# Patient Record
Sex: FEMALE | Race: WHITE | NOT HISPANIC OR LATINO | Employment: UNEMPLOYED | URBAN - METROPOLITAN AREA
[De-identification: names, ages, dates, MRNs, and addresses within clinical notes are randomized per-mention and may not be internally consistent; named-entity substitution may affect disease eponyms.]

---

## 2018-02-07 ENCOUNTER — HOSPITAL ENCOUNTER (EMERGENCY)
Facility: CLINIC | Age: 53
Discharge: HOME | End: 2018-02-07
Attending: EMERGENCY MEDICINE | Admitting: EMERGENCY MEDICINE
Payer: COMMERCIAL

## 2018-02-07 ENCOUNTER — APPOINTMENT (OUTPATIENT)
Dept: CT IMAGING | Facility: CLINIC | Age: 53
End: 2018-02-07
Attending: EMERGENCY MEDICINE
Payer: COMMERCIAL

## 2018-02-07 VITALS
TEMPERATURE: 100.6 F | DIASTOLIC BLOOD PRESSURE: 68 MMHG | BODY MASS INDEX: 25.27 KG/M2 | HEIGHT: 64 IN | OXYGEN SATURATION: 95 % | RESPIRATION RATE: 16 BRPM | WEIGHT: 148 LBS | SYSTOLIC BLOOD PRESSURE: 122 MMHG

## 2018-02-07 DIAGNOSIS — E87.6 HYPOKALEMIA: ICD-10-CM

## 2018-02-07 DIAGNOSIS — R50.9 FEVER, UNKNOWN ORIGIN: ICD-10-CM

## 2018-02-07 LAB
ALBUMIN UR-MCNC: NEGATIVE MG/DL
ANION GAP SERPL CALCULATED.3IONS-SCNC: 10 MMOL/L (ref 3–14)
APPEARANCE UR: CLEAR
BASOPHILS # BLD AUTO: 0.1 10E9/L (ref 0–0.2)
BASOPHILS NFR BLD AUTO: 0.5 %
BILIRUB UR QL STRIP: ABNORMAL
BUN SERPL-MCNC: 6 MG/DL (ref 7–30)
CALCIUM SERPL-MCNC: 8.7 MG/DL (ref 8.5–10.1)
CHLORIDE SERPL-SCNC: 101 MMOL/L (ref 94–109)
CO2 SERPL-SCNC: 25 MMOL/L (ref 20–32)
COLOR UR AUTO: YELLOW
CREAT SERPL-MCNC: 0.64 MG/DL (ref 0.52–1.04)
DIFFERENTIAL METHOD BLD: ABNORMAL
EOSINOPHIL # BLD AUTO: 0 10E9/L (ref 0–0.7)
EOSINOPHIL NFR BLD AUTO: 0.1 %
ERYTHROCYTE [DISTWIDTH] IN BLOOD BY AUTOMATED COUNT: 13.6 % (ref 10–15)
FLUAV+FLUBV AG SPEC QL: NEGATIVE
FLUAV+FLUBV AG SPEC QL: NEGATIVE
GFR SERPL CREATININE-BSD FRML MDRD: >90 ML/MIN/1.7M2
GLUCOSE SERPL-MCNC: 93 MG/DL (ref 70–99)
GLUCOSE UR STRIP-MCNC: NEGATIVE MG/DL
HCT VFR BLD AUTO: 39.7 % (ref 35–47)
HGB BLD-MCNC: 13.2 G/DL (ref 11.7–15.7)
HGB UR QL STRIP: NEGATIVE
IMM GRANULOCYTES # BLD: 0 10E9/L (ref 0–0.4)
IMM GRANULOCYTES NFR BLD: 0.1 %
KETONES UR STRIP-MCNC: 40 MG/DL
LACTATE SERPL-SCNC: 0.9 MMOL/L (ref 0.4–2)
LACTATE SERPL-SCNC: 2.1 MMOL/L (ref 0.4–2)
LEUKOCYTE ESTERASE UR QL STRIP: NEGATIVE
LYMPHOCYTES # BLD AUTO: 0.5 10E9/L (ref 0.8–5.3)
LYMPHOCYTES NFR BLD AUTO: 4.6 %
MCH RBC QN AUTO: 27.2 PG (ref 26.5–33)
MCHC RBC AUTO-ENTMCNC: 33.2 G/DL (ref 31.5–36.5)
MCV RBC AUTO: 82 FL (ref 78–100)
MONOCYTES # BLD AUTO: 0.4 10E9/L (ref 0–1.3)
MONOCYTES NFR BLD AUTO: 4.4 %
NEUTROPHILS # BLD AUTO: 9 10E9/L (ref 1.6–8.3)
NEUTROPHILS NFR BLD AUTO: 90.3 %
NITRATE UR QL: NEGATIVE
NRBC # BLD AUTO: 0 10*3/UL
NRBC BLD AUTO-RTO: 0 /100
PH UR STRIP: 6.5 PH (ref 5–7)
PLATELET # BLD AUTO: 321 10E9/L (ref 150–450)
POTASSIUM SERPL-SCNC: 2.9 MMOL/L (ref 3.4–5.3)
RBC # BLD AUTO: 4.86 10E12/L (ref 3.8–5.2)
SODIUM SERPL-SCNC: 136 MMOL/L (ref 133–144)
SOURCE: ABNORMAL
SP GR UR STRIP: 1.02 (ref 1–1.03)
SPECIMEN SOURCE: NORMAL
UROBILINOGEN UR STRIP-ACNC: 0.2 EU/DL (ref 0.2–1)
WBC # BLD AUTO: 10 10E9/L (ref 4–11)

## 2018-02-07 PROCEDURE — 85025 COMPLETE CBC W/AUTO DIFF WBC: CPT | Performed by: EMERGENCY MEDICINE

## 2018-02-07 PROCEDURE — 83605 ASSAY OF LACTIC ACID: CPT | Performed by: EMERGENCY MEDICINE

## 2018-02-07 PROCEDURE — 96374 THER/PROPH/DIAG INJ IV PUSH: CPT

## 2018-02-07 PROCEDURE — 80048 BASIC METABOLIC PNL TOTAL CA: CPT | Performed by: EMERGENCY MEDICINE

## 2018-02-07 PROCEDURE — 87804 INFLUENZA ASSAY W/OPTIC: CPT | Performed by: EMERGENCY MEDICINE

## 2018-02-07 PROCEDURE — 96360 HYDRATION IV INFUSION INIT: CPT

## 2018-02-07 PROCEDURE — 25000132 ZZH RX MED GY IP 250 OP 250 PS 637: Performed by: EMERGENCY MEDICINE

## 2018-02-07 PROCEDURE — 99285 EMERGENCY DEPT VISIT HI MDM: CPT | Mod: 25

## 2018-02-07 PROCEDURE — 74176 CT ABD & PELVIS W/O CONTRAST: CPT

## 2018-02-07 PROCEDURE — 81003 URINALYSIS AUTO W/O SCOPE: CPT | Performed by: EMERGENCY MEDICINE

## 2018-02-07 PROCEDURE — 87040 BLOOD CULTURE FOR BACTERIA: CPT | Mod: 91 | Performed by: EMERGENCY MEDICINE

## 2018-02-07 PROCEDURE — 96361 HYDRATE IV INFUSION ADD-ON: CPT

## 2018-02-07 PROCEDURE — 25000128 H RX IP 250 OP 636: Performed by: EMERGENCY MEDICINE

## 2018-02-07 RX ORDER — POTASSIUM CHLORIDE 1500 MG/1
20 TABLET, EXTENDED RELEASE ORAL 2 TIMES DAILY
Qty: 14 TABLET | Refills: 0 | Status: SHIPPED | OUTPATIENT
Start: 2018-02-07 | End: 2018-02-14

## 2018-02-07 RX ORDER — ACETAMINOPHEN 500 MG
1000 TABLET ORAL ONCE
Status: COMPLETED | OUTPATIENT
Start: 2018-02-07 | End: 2018-02-07

## 2018-02-07 RX ORDER — KETOROLAC TROMETHAMINE 30 MG/ML
30 INJECTION, SOLUTION INTRAMUSCULAR; INTRAVENOUS ONCE
Status: DISCONTINUED | OUTPATIENT
Start: 2018-02-07 | End: 2018-02-08 | Stop reason: HOSPADM

## 2018-02-07 RX ORDER — POTASSIUM CHLORIDE 1.5 G/1.58G
40 POWDER, FOR SOLUTION ORAL ONCE
Status: DISCONTINUED | OUTPATIENT
Start: 2018-02-07 | End: 2018-02-08 | Stop reason: HOSPADM

## 2018-02-07 RX ORDER — POTASSIUM CHLORIDE 1500 MG/1
40 TABLET, EXTENDED RELEASE ORAL ONCE
Status: COMPLETED | OUTPATIENT
Start: 2018-02-07 | End: 2018-02-07

## 2018-02-07 RX ADMIN — ACETAMINOPHEN 1000 MG: 500 TABLET, FILM COATED ORAL at 20:24

## 2018-02-07 RX ADMIN — SODIUM CHLORIDE 1000 ML: 9 INJECTION, SOLUTION INTRAVENOUS at 20:25

## 2018-02-07 RX ADMIN — POTASSIUM CHLORIDE 40 MEQ: 1500 TABLET, EXTENDED RELEASE ORAL at 22:36

## 2018-02-07 RX ADMIN — SODIUM CHLORIDE 1000 ML: 9 INJECTION, SOLUTION INTRAVENOUS at 21:46

## 2018-02-07 ASSESSMENT — ENCOUNTER SYMPTOMS
DIAPHORESIS: 0
SORE THROAT: 0
COUGH: 0
DYSURIA: 0
BACK PAIN: 1
ABDOMINAL PAIN: 1
CHILLS: 1
FEVER: 1
MYALGIAS: 1
ARTHRALGIAS: 1

## 2018-02-07 NOTE — ED AVS SNAPSHOT
Emergency Department    [address] [place] [address]    Phone:  [phone]    Fax:  [phone]                                       Jacqueline Ruiz   MRN: [id number]    Department:   Emergency Department   Date of Visit:  2/7/2018           After Visit Summary Signature Page     I have received my discharge instructions, and my questions have been answered. I have discussed any challenges I see with this plan with the nurse or doctor.    ..........................................................................................................................................  Patient/Patient Representative Signature      ..........................................................................................................................................  Patient Representative Print Name and Relationship to Patient    ..................................................               ................................................  Date                                            Time    ..........................................................................................................................................  Reviewed by Signature/Title    ...................................................              ..............................................  Date                                                            Time

## 2018-02-07 NOTE — ED AVS SNAPSHOT
Emergency Department    [address] [facility] [address]    Phone:  [phone]    Fax:  [phone]                                       Jacqueline Ruiz   MRN: [id number]    Department:   Emergency Department   Date of Visit:  2/7/2018           Patient Information     Date Of Birth          1965        Your diagnoses for this visit were:     Fever, unknown origin     Hypokalemia        You were seen by Garry Malone MD.      Follow-up Information     Follow up with Austin Aj MD.    Specialty:  Gastroenterology    Why:  As needed, If symptoms worsen    Contact information:    [facility]  [address] [place] [address] [phone]          Follow up with  Emergency Department.    Specialty:  EMERGENCY MEDICINE    Why:  As needed, If symptoms worsen    Contact information:    [address] [facility] [id number] [phone]        Discharge Instructions       These foods are high in Potassium.  In addition to any Potassium pills prescribed, these foods will more quickly improve the Potassium level in your body.    Baked Potatoes  Tomatoes  Lima Beans  Spinach  Bananas  Cantaloupe  Raisins  Oranges        Call the on-call GI specialist if your symptoms worsen.  Take your potassium supplement.  If you are having ongoing fevers chills sweats or worsening abdominal pain return to the emergency department    24 Hour Appointment Hotline       To make an appointment at any [facility] clinic, call [phone] ([phone]). If you don't have a family doctor or clinic, we will help you find one. [facility] clinics are conveniently located to serve the needs of you and your family.             Review of your medicines      START taking        Dose / Directions Last dose taken    potassium chloride SA 20 MEQ CR tablet   Commonly known as:  K-DUR/KLOR-CON M   Dose:  20 mEq   Quantity:  14 tablet        Take 1 tablet (20 mEq) by mouth 2 times  daily for 7 days   Refills:  0                Prescriptions were sent or printed at these locations (1 Prescription)                   Other Prescriptions                Printed at Department/Unit printer (1 of 1)         potassium chloride SA (K-DUR/KLOR-CON M) 20 MEQ CR tablet                Procedures and tests performed during your visit     Procedure/Test Number of Times Performed    *UA reflex to Microscopic (ED Lab POCT Only 3-11) 1    Basic metabolic panel 1    Blood culture 2    CBC with platelets differential 1    CT Abdomen pelvis - oral contrast only 1    Influenza A/B antigen 1    Lactic acid 2      Orders Needing Specimen Collection     None      Pending Results     Date and Time Order Name Status Description    2/7/2018 2016 Blood culture In process     2/7/2018 2016 Blood culture In process             Pending Culture Results     Date and Time Order Name Status Description    2/7/2018 2016 Blood culture In process     2/7/2018 2016 Blood culture In process             Pending Results Instructions     If you had any lab results that were not finalized at the time of your Discharge, you can call the ED Lab Result RN at [phone]. You will be contacted by this team for any positive Lab results or changes in treatment. The nurses are available 7 days a week from 10A to 6:30P.  You can leave a message 24 hours per day and they will return your call.        Test Results From Your Hospital Stay        2/7/2018  7:42 PM      Component Results     Component Value Ref Range & Units Status    Influenza A/B Agn Specimen Nasal  Final    Influenza A Negative NEG^Negative Final    Influenza B Negative NEG^Negative Final    Test results must be correlated with clinical data. If necessary, results   should be confirmed by a molecular assay or viral culture.           2/7/2018  8:25 PM      Component Results     Component Value Ref Range & Units Status    WBC 10.0 4.0 - 11.0 10e9/L Final    RBC Count 4.86 3.8 - 5.2  10e12/L Final    Hemoglobin 13.2 11.7 - 15.7 g/dL Final    Hematocrit 39.7 35.0 - 47.0 % Final    MCV 82 78 - 100 fl Final    MCH 27.2 26.5 - 33.0 pg Final    MCHC 33.2 31.5 - 36.5 g/dL Final    RDW 13.6 10.0 - 15.0 % Final    Platelet Count 321 150 - 450 10e9/L Final    Diff Method Automated Method  Final    % Neutrophils 90.3 % Final    % Lymphocytes 4.6 % Final    % Monocytes 4.4 % Final    % Eosinophils 0.1 % Final    % Basophils 0.5 % Final    % Immature Granulocytes 0.1 % Final    Nucleated RBCs 0 0 /100 Final    Absolute Neutrophil 9.0 (H) 1.6 - 8.3 10e9/L Final    Absolute Lymphocytes 0.5 (L) 0.8 - 5.3 10e9/L Final    Absolute Monocytes 0.4 0.0 - 1.3 10e9/L Final    Absolute Eosinophils 0.0 0.0 - 0.7 10e9/L Final    Absolute Basophils 0.1 0.0 - 0.2 10e9/L Final    Abs Immature Granulocytes 0.0 0 - 0.4 10e9/L Final    Absolute Nucleated RBC 0.0  Final         2/7/2018  8:42 PM      Component Results     Component Value Ref Range & Units Status    Sodium 136 133 - 144 mmol/L Final    Potassium 2.9 (L) 3.4 - 5.3 mmol/L Final    Chloride 101 94 - 109 mmol/L Final    Carbon Dioxide 25 20 - 32 mmol/L Final    Anion Gap 10 3 - 14 mmol/L Final    Glucose 93 70 - 99 mg/dL Final    Urea Nitrogen 6 (L) 7 - 30 mg/dL Final    Creatinine 0.64 0.52 - 1.04 mg/dL Final    GFR Estimate >90 >60 mL/min/1.7m2 Final    Non  GFR Calc    GFR Estimate If Black >90 >60 mL/min/1.7m2 Final    African American GFR Calc    Calcium 8.7 8.5 - 10.1 mg/dL Final         2/7/2018  9:53 PM         2/7/2018 10:33 PM         2/7/2018  8:26 PM      Component Results     Component Value Ref Range & Units Status    Lactic Acid 2.1 (H) 0.4 - 2.0 mmol/L Final         2/7/2018 11:10 PM      Narrative     CT ABDOMEN AND PELVIS WITHOUT CONTRAST 2/7/2018 9:08 PM    HISTORY: Colonoscopy earlier today with biopsies. Now with fever.  Evaluate for perforation. History of Crohn's disease.    TECHNIQUE: Helical axial scans from the dome of liver  through the  pubic symphysis without contrast. Radiation dose for this scan was  reduced using automated exposure control, adjustment of the mA and/or  kV according to patient size, or iterative reconstruction technique.    COMPARISON: None.    FINDINGS: There is no free intraperitoneal air. Prior cholecystectomy.  1.4 cm lucency posterior right lobe of liver shows water density and  is likely a benign cyst. The remainder of the liver without contrast  is unremarkable. The spleen, pancreas, bilateral adrenal glands and  right kidney are normal without contrast. There is a 1.3 cm benign  cyst in the anterior midpole of the left kidney and the left kidney is  otherwise normal without contrast. A prior partial right colectomy is  noted. There is gas and some stool scattered in the colon. There is  scattered small bowel gas in nondistended loops. No acute inflammatory  process is seen. Mild vascular calcifications are noted.    Scans through the pelvis show no acute abnormality. There is uterine  enlargement, likely related to multiple myomata. This could be  correlated with pelvic ultrasound. No free fluid. Mild S-shaped  curvature thoracolumbar spine is noted. Small benign bone island  posterior left iliac bone.        Impression     IMPRESSION:  1. No free intraperitoneal air.  2. Status post cholecystectomy.  3. Small benign cyst posterior right lobe of liver. Small benign cyst  left kidney.  4. Prior partial right colectomy. Mild vascular calcifications.  5. 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 Recommend  correlation with ultrasound.    MELISSA CAO MD         2/7/2018  9:46 PM      Component Results     Component Value Ref Range & Units Status    Color Urine Yellow  Final    Appearance Urine Clear  Final    Glucose Urine Negative NEG^Negative mg/dL Final    Bilirubin Urine Small (A) NEG^Negative Final    This is an unconfirmed screening test result. A positive result may be false.    Ketones Urine 40  (A) NEG^Negative mg/dL Final    Specific Gravity Urine 1.020 1.003 - 1.035 Final    Blood Urine Negative NEG^Negative Final    pH Urine 6.5 5.0 - 7.0 pH Final    Protein Albumin Urine Negative NEG^Negative mg/dL Final    Urobilinogen Urine 0.2 0.2 - 1.0 EU/dL Final    Nitrite Urine Negative NEG^Negative Final    Leukocyte Esterase Urine Negative NEG^Negative Final    Source Midstream Urine  Final         2/7/2018 10:43 PM      Component Results     Component Value Ref Range & Units Status    Lactic Acid 0.9 0.4 - 2.0 mmol/L Final                Clinical Quality Measure: Blood Pressure Screening     Your blood pressure was checked while you were in the emergency department today. The last reading we obtained was  BP: 122/68 . Please read the guidelines below about what these numbers mean and what you should do about them.  If your systolic blood pressure (the top number) is less than 120 and your diastolic blood pressure (the bottom number) is less than 80, then your blood pressure is normal. There is nothing more that you need to do about it.  If your systolic blood pressure (the top number) is 120-139 or your diastolic blood pressure (the bottom number) is 80-89, your blood pressure may be higher than it should be. You should have your blood pressure rechecked within a year by a primary care provider.  If your systolic blood pressure (the top number) is 140 or greater or your diastolic blood pressure (the bottom number) is 90 or greater, you may have high blood pressure. High blood pressure is treatable, but if left untreated over time it can put you at risk for heart attack, stroke, or kidney failure. You should have your blood pressure rechecked by a primary care provider within the next 4 weeks.  If your provider in the emergency department today gave you specific instructions to follow-up with your doctor or provider even sooner than that, you should follow that instruction and not wait for up to 4 weeks for  "your follow-up visit.        Thank you for choosing [facility]       Thank you for choosing [facility] for your care. Our goal is always to provide you with excellent care. Hearing back from our patients is one way we can continue to improve our services. Please take a few minutes to complete the written survey that you may receive in the mail after you visit with us. Thank you!        [employer] Information     [email or web address] lets you send messages to your doctor, view your test results, renew your prescriptions, schedule appointments and more. To sign up, go to [email or web address] . Click on \"Log in\" on the left side of the screen, which will take you to the Welcome page. Then click on \"Sign up Now\" on the right side of the page.     You will be asked to enter the access code listed below, as well as some personal information. Please follow the directions to create your username and password.     Your access code is: [email or web address]  Expires: 2018 11:15 PM     Your access code will  in 90 days. If you need help or a new code, please call your [facility] clinic or [phone].        Care EveryWhere ID     This is your Care EveryWhere ID. This could be used by other organizations to access your [facility] medical records  [id number]        Equal Access to Services     DAMON MATTSON : Oren muñoz Sokate, waaxda luqadaha, qaybta kaalmada adeegyada, garrett das. So [facility] [phone].    ATENCIÓN: Si habla español, tiene a castillo disposición servicios gratuitos de asistencia lingüística. Llame al [phone].    We comply with applicable federal civil rights laws and [place] laws. We do not discriminate on the basis of race, color, national origin, age, disability, sex, sexual orientation, or gender identity.            After Visit Summary       This is your record. Keep this with you and show to your community pharmacist(s) and doctor(s) at your next visit.                  "

## 2018-02-08 NOTE — ED PROVIDER NOTES
History     Chief Complaint:  Fever    HPI   Jacqueline Ruiz is a 52 year old female with a history of Crohn's who presents with a fever. The patient had a routine colonoscopy today and felt fine prior to this; her colonoscopy was returned as normal for her Crohn's, as noted below, and she reports them taking several biopsies at that time. Following the procedure, she developed a fever, body aches, and chills. She reports lower right sided abdominal and back pain.  She notes a headache which she attributes to the effects of the anaesthetic and has since resolved. Of note, the anaesthetic used was Propofol which is something she has not had in the past. She denies soy or egg allergies. She denies sore throat, cough, diaphoresis, or ill contacts. She notes no dysuria.    Colonoscopy Report 2/7:  A few aphthous ulcer in the distal ilium and one in rectum  Some dysplasia biopsies were taken from the transverse and left colon  Chron's disease of the small and large intestine  History of bowel resection  GI Physician: Dr. Aj    Allergies:  Other [Seasonal Allergies]  Morphine  Sulfa Drugs  Vicodin [Hydrocodone-Acetaminophen]    IV dye    Medications:    The patient is currently on no regular medications.      Past Medical History:    Crohn's disease  Bowel perforation    Past Surgical History:    Bowel resection x6, last in 2004    Family History:    History review. No contributing family history.     Social History:  Smoking status: no  Alcohol use: no   PCP: Vance Good   Patient presents with .   Marital Status:       Review of Systems   Constitutional: Positive for chills and fever. Negative for diaphoresis.   HENT: Negative for congestion and sore throat.    Respiratory: Negative for cough.    Gastrointestinal: Positive for abdominal pain.   Genitourinary: Negative for dysuria.   Musculoskeletal: Positive for arthralgias, back pain and myalgias.   All other systems reviewed and are  "negative.    Physical Exam     Patient Vitals for the past 24 hrs:   BP Temp Temp src Heart Rate Resp SpO2 Height Weight   02/07/18 2148 - 100.6  F (38.1  C) Oral - - - - -   02/07/18 2119 122/68 102.1  F (38.9  C) Oral 100 - 95 % - -   02/07/18 2118 - - - - - 94 % - -   02/07/18 2117 122/68 - - - - - - -   02/07/18 1858 168/88 102.4  F (39.1  C) Oral 117 16 99 % 1.626 m (5' 4\") 67.1 kg (148 lb)     Physical Exam  General: Resting comfortably on the gurney, anxious, flushed cheeks from fever  Head:  The scalp, face, and head appear normal  Eyes:  The pupils are equal, round, and reactive to light    There is no nystagmus    Extraocular muscles are intact    Conjunctivae and sclerae are normal  ENT:    The nose is normal    Pinnae are normal    External acoustic canals are normal    Tympanic membranes are normal    The oropharynx is normal    Uvula is in the midline  Neck:  Normal range of motion    There is no rigidity noted    There is no midline cervical spine pain/tenderness    Trachea is in the midline    No mass is detected  CV:  Tachycardic rate from fever and underlying rhythm     Normal S1 and S2    No S3 or S4    No pathological murmur detected  Resp:  Lungs are clear    There is no tachypnea    Non-labored    No rales    No wheezing   GI:  Abdomen is soft, there is no rigidity    No distension    No tympani    No rebound tenderness     Non-surgical without peritoneal features  :  Penis is normal    No urethral discharge    Circumcised    Testicles normal and non-tender, no mass    Epididymis normal    No inguinal hernia  MS:  Normal muscular tone    Symmetric motor strength    No major joint effusions    No asymmetric swelling    No calf tenderness  Skin:  No rash or acute skin lesions noted  Neuro: Speech is normal and fluent  Psych:  Awake. Alert.  Normal affect.  Appropriate interactions.  Lymph: No anterior or posterior cervical lymphadenopathy noted    Emergency Department Course "     Imaging:  Radiographic findings were communicated with the patient who voiced understanding of the findings.    CT-scan Abdomen/Pelvis w/o contrast:  1. No free intraperitoneal air.  2. Status post cholecystectomy.  3. Small benign cyst posterior right lobe of liver. Small benign cyst  left kidney.  4. Prior partial right colectomy. Mild vascular calcifications.  5. 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 Recommend  correlation with ultrasound.  Preliminary result per radiology.      Laboratory:  2016 - Lactic Acid: 2.1 (H)  2156 - Lactic Acid: 0.9    CBC: WNL (WBC 10.0, HGB 13.2, )   BMP: Potassium 2.9 (L), BUN 6 (L), o/w WNL (Creatinine 0.64)  Blood Culture x2    Influenza: Negative    UA: Small bilin, 40 ketones, o/w WNL    Interventions:   2024: Tylenol 1000 mg PO  2025: NS 1L IV Bolus   2146: NS 1L IV Bolus   2236: Klor-Con 40 mEq PO    Emergency Department Course:  Past medical records, nursing notes, and vitals reviewed.  1958: I performed an exam of the patient and obtained history, as documented above. GCS 15.   The patient was taken for CT, see imaging results above.   IV inserted and blood drawn.   2214: I rechecked the patient and she is feeling improved.  2221: I discussed the patient with Dr. Frey GI. He recommends discharge with close follow up pending culture results in the morning.  2242: I rechecked the patient. Findings and plan explained to the Patient. Patient discharged home with instructions regarding supportive care, medications, and reasons to return. The importance of close follow-up was reviewed.      Impression & Plan      CMS Diagnoses: Lactate is greater than 2 due to dehydration, at this time there is no sign of severe sepsis or septic shock.    Medical Decision Making:  This patient presents with a fever after colonoscopy as noted above.  Patient underwent a detailed workup for possible perforation and looking for infectious etiologies.  The patient has had a  fever in the range of 102 she has no influenza-like symptoms.  The patient's abdominal pain is largely resolved after IV hydration.  Her CT scan did not reveal any evidence of perforation or abscess.  IV dye could not be used given an allergy remotely.  The patient's white count is 10 making serious bacterial infection less likely although she did have a slight left shift.  The patient's initial lactate was 2.1 which could technically meet Criteria for severe sepsis although the patient has no obvious source of infection and her lactate resolved with IV fluid hydration alone.  The patient therefore does not have significant severe sepsis or septic shock.  The patient's urine and blood has been cultured.  The possibility of a transient bacteremia is in the differential diagnosis.  Also in the differential would be an allergic or serum reaction to propofol although this is less likely but possible.  She has not received this anesthetic in the past during colonoscopy.  I discussed the entire case with the on-call gastroenterologist who is aware.  The patient will follow up with her GI office tomorrow.  We will call if a blood culture or the urine culture is positive.  No empiric antibiotics will be prescribed at this time.  Patient is noted to have starvation ketonuria and hypokalemia likely from her bowel prep and episode of vomiting and poor hydration/nutrition today.  She will be placed on potassium replacement.    Diagnosis:    ICD-10-CM    1. Fever, unknown origin R50.9    2. Hypokalemia E87.6        Disposition:  discharged to home    Discharge Medications:  New Prescriptions    No medications on file         Mallika Lowe  2/7/2018    EMERGENCY DEPARTMENT  Mallika ZUÑIGA, am serving as a scribe at 7:58 PM on 2/7/2018 to document services personally performed by Garry Malone MD based on my observations and the provider's statements to me.        Garry Malone MD  02/07/18 1297

## 2018-02-08 NOTE — DISCHARGE INSTRUCTIONS
These foods are high in Potassium.  In addition to any Potassium pills prescribed, these foods will more quickly improve the Potassium level in your body.    Baked Potatoes  Tomatoes  Lima Beans  Spinach  Bananas  Cantaloupe  Raisins  Oranges        Call the on-call GI specialist if your symptoms worsen.  Take your potassium supplement.  If you are having ongoing fevers chills sweats or worsening abdominal pain return to the emergency department

## 2018-02-13 LAB
BACTERIA SPEC CULT: NO GROWTH
Lab: NORMAL
SPECIMEN SOURCE: NORMAL

## 2018-02-14 LAB
BACTERIA SPEC CULT: NO GROWTH
Lab: NORMAL
SPECIMEN SOURCE: NORMAL

## 2018-05-02 ENCOUNTER — APPOINTMENT (OUTPATIENT)
Dept: VASCULAR SURGERY | Facility: CLINIC | Age: 53
End: 2018-05-02
Payer: COMMERCIAL

## 2018-05-02 PROCEDURE — 99207 ZZC VEINSOLUTIONS FREE SCREENING: CPT | Performed by: SURGERY

## 2018-06-15 ENCOUNTER — HOSPITAL ENCOUNTER (OUTPATIENT)
Dept: ULTRASOUND IMAGING | Facility: CLINIC | Age: 53
Discharge: HOME | End: 2018-06-15
Attending: SPECIALIST | Admitting: SPECIALIST
Payer: COMMERCIAL

## 2018-06-15 DIAGNOSIS — E04.2 MULTINODULAR GOITER: ICD-10-CM

## 2018-06-15 PROCEDURE — 76536 US EXAM OF HEAD AND NECK: CPT

## 2019-09-06 ENCOUNTER — HOSPITAL ENCOUNTER (OUTPATIENT)
Dept: MAMMOGRAPHY | Facility: CLINIC | Age: 54
Discharge: HOME | End: 2019-09-06
Attending: PHYSICIAN ASSISTANT | Admitting: PHYSICIAN ASSISTANT
Payer: COMMERCIAL

## 2019-09-06 DIAGNOSIS — Z12.31 VISIT FOR SCREENING MAMMOGRAM: ICD-10-CM

## 2019-09-06 PROCEDURE — 77067 SCR MAMMO BI INCL CAD: CPT

## 2021-05-01 ENCOUNTER — APPOINTMENT (OUTPATIENT)
Dept: CT IMAGING | Facility: CLINIC | Age: 56
DRG: 394 | End: 2021-05-01
Attending: EMERGENCY MEDICINE
Payer: COMMERCIAL

## 2021-05-01 ENCOUNTER — HOSPITAL ENCOUNTER (INPATIENT)
Facility: CLINIC | Age: 56
LOS: 3 days | Discharge: HOME | DRG: 394 | End: 2021-05-06
Attending: EMERGENCY MEDICINE | Admitting: INTERNAL MEDICINE
Payer: COMMERCIAL

## 2021-05-01 DIAGNOSIS — E87.6 HYPOKALEMIA: ICD-10-CM

## 2021-05-01 DIAGNOSIS — K52.9 COLITIS: ICD-10-CM

## 2021-05-01 DIAGNOSIS — R19.7 DIARRHEA, UNSPECIFIED TYPE: ICD-10-CM

## 2021-05-01 DIAGNOSIS — K50.018 CROHN'S DISEASE OF SMALL INTESTINE WITH OTHER COMPLICATION (H): ICD-10-CM

## 2021-05-01 LAB
ALBUMIN SERPL-MCNC: 3.4 G/DL (ref 3.4–5)
ALP SERPL-CCNC: 54 U/L (ref 40–150)
ALT SERPL W P-5'-P-CCNC: 14 U/L (ref 0–50)
ANION GAP SERPL CALCULATED.3IONS-SCNC: 4 MMOL/L (ref 3–14)
AST SERPL W P-5'-P-CCNC: 10 U/L (ref 0–45)
BASOPHILS # BLD AUTO: 0.1 10E9/L (ref 0–0.2)
BASOPHILS NFR BLD AUTO: 0.6 %
BILIRUB SERPL-MCNC: 1 MG/DL (ref 0.2–1.3)
BUN SERPL-MCNC: 7 MG/DL (ref 7–30)
C DIFF TOX B STL QL: NEGATIVE
CALCIUM SERPL-MCNC: 8.7 MG/DL (ref 8.5–10.1)
CHLORIDE SERPL-SCNC: 105 MMOL/L (ref 94–109)
CO2 SERPL-SCNC: 29 MMOL/L (ref 20–32)
CREAT SERPL-MCNC: 0.65 MG/DL (ref 0.52–1.04)
CRP SERPL-MCNC: <2.9 MG/L (ref 0–8)
DIFFERENTIAL METHOD BLD: NORMAL
EOSINOPHIL # BLD AUTO: 0.1 10E9/L (ref 0–0.7)
EOSINOPHIL NFR BLD AUTO: 1 %
ERYTHROCYTE [DISTWIDTH] IN BLOOD BY AUTOMATED COUNT: 13.2 % (ref 10–15)
GFR SERPL CREATININE-BSD FRML MDRD: >90 ML/MIN/{1.73_M2}
GLUCOSE SERPL-MCNC: 91 MG/DL (ref 70–99)
HCT VFR BLD AUTO: 39.2 % (ref 35–47)
HGB BLD-MCNC: 12.6 G/DL (ref 11.7–15.7)
IMM GRANULOCYTES # BLD: 0 10E9/L (ref 0–0.4)
IMM GRANULOCYTES NFR BLD: 0.3 %
LABORATORY COMMENT REPORT: NORMAL
LYMPHOCYTES # BLD AUTO: 1.5 10E9/L (ref 0.8–5.3)
LYMPHOCYTES NFR BLD AUTO: 14.4 %
MCH RBC QN AUTO: 27.1 PG (ref 26.5–33)
MCHC RBC AUTO-ENTMCNC: 32.1 G/DL (ref 31.5–36.5)
MCV RBC AUTO: 84 FL (ref 78–100)
MONOCYTES # BLD AUTO: 0.8 10E9/L (ref 0–1.3)
MONOCYTES NFR BLD AUTO: 7.7 %
NEUTROPHILS # BLD AUTO: 7.7 10E9/L (ref 1.6–8.3)
NEUTROPHILS NFR BLD AUTO: 76 %
NRBC # BLD AUTO: 0 10*3/UL
NRBC BLD AUTO-RTO: 0 /100
PLATELET # BLD AUTO: 410 10E9/L (ref 150–450)
POTASSIUM SERPL-SCNC: 2.6 MMOL/L (ref 3.4–5.3)
POTASSIUM SERPL-SCNC: 2.9 MMOL/L (ref 3.4–5.3)
PROT SERPL-MCNC: 7.1 G/DL (ref 6.8–8.8)
RBC # BLD AUTO: 4.65 10E12/L (ref 3.8–5.2)
SARS-COV-2 RNA RESP QL NAA+PROBE: NEGATIVE
SODIUM SERPL-SCNC: 138 MMOL/L (ref 133–144)
SPECIMEN SOURCE: NORMAL
SPECIMEN SOURCE: NORMAL
WBC # BLD AUTO: 10.1 10E9/L (ref 4–11)

## 2021-05-01 PROCEDURE — 258N000003 HC RX IP 258 OP 636: Performed by: EMERGENCY MEDICINE

## 2021-05-01 PROCEDURE — 87506 IADNA-DNA/RNA PROBE TQ 6-11: CPT | Performed by: EMERGENCY MEDICINE

## 2021-05-01 PROCEDURE — 96365 THER/PROPH/DIAG IV INF INIT: CPT

## 2021-05-01 PROCEDURE — 96366 THER/PROPH/DIAG IV INF ADDON: CPT

## 2021-05-01 PROCEDURE — 36415 COLL VENOUS BLD VENIPUNCTURE: CPT | Performed by: INTERNAL MEDICINE

## 2021-05-01 PROCEDURE — 99220 PR INITIAL OBSERVATION CARE,LEVEL III: CPT | Performed by: INTERNAL MEDICINE

## 2021-05-01 PROCEDURE — G0378 HOSPITAL OBSERVATION PER HR: HCPCS

## 2021-05-01 PROCEDURE — 74176 CT ABD & PELVIS W/O CONTRAST: CPT

## 2021-05-01 PROCEDURE — 86140 C-REACTIVE PROTEIN: CPT | Performed by: EMERGENCY MEDICINE

## 2021-05-01 PROCEDURE — 250N000011 HC RX IP 250 OP 636: Performed by: INTERNAL MEDICINE

## 2021-05-01 PROCEDURE — 80053 COMPREHEN METABOLIC PANEL: CPT | Performed by: EMERGENCY MEDICINE

## 2021-05-01 PROCEDURE — 84132 ASSAY OF SERUM POTASSIUM: CPT | Performed by: INTERNAL MEDICINE

## 2021-05-01 PROCEDURE — 250N000011 HC RX IP 250 OP 636: Performed by: EMERGENCY MEDICINE

## 2021-05-01 PROCEDURE — 96361 HYDRATE IV INFUSION ADD-ON: CPT

## 2021-05-01 PROCEDURE — 87635 SARS-COV-2 COVID-19 AMP PRB: CPT | Performed by: EMERGENCY MEDICINE

## 2021-05-01 PROCEDURE — 250N000013 HC RX MED GY IP 250 OP 250 PS 637: Performed by: EMERGENCY MEDICINE

## 2021-05-01 PROCEDURE — C9803 HOPD COVID-19 SPEC COLLECT: HCPCS

## 2021-05-01 PROCEDURE — 87493 C DIFF AMPLIFIED PROBE: CPT | Performed by: EMERGENCY MEDICINE

## 2021-05-01 PROCEDURE — 85025 COMPLETE CBC W/AUTO DIFF WBC: CPT | Performed by: EMERGENCY MEDICINE

## 2021-05-01 PROCEDURE — 99285 EMERGENCY DEPT VISIT HI MDM: CPT | Mod: 25

## 2021-05-01 RX ORDER — LIDOCAINE 40 MG/G
CREAM TOPICAL
Status: DISCONTINUED | OUTPATIENT
Start: 2021-05-01 | End: 2021-05-06 | Stop reason: HOSPADM

## 2021-05-01 RX ORDER — ONDANSETRON 2 MG/ML
4 INJECTION INTRAMUSCULAR; INTRAVENOUS EVERY 6 HOURS PRN
Status: DISCONTINUED | OUTPATIENT
Start: 2021-05-01 | End: 2021-05-06 | Stop reason: HOSPADM

## 2021-05-01 RX ORDER — POTASSIUM CHLORIDE 1.5 G/1.58G
40 POWDER, FOR SOLUTION ORAL ONCE
Status: COMPLETED | OUTPATIENT
Start: 2021-05-01 | End: 2021-05-01

## 2021-05-01 RX ORDER — POTASSIUM CHLORIDE 20MEQ/15ML
20 LIQUID (ML) ORAL DAILY
COMMUNITY
End: 2021-05-01

## 2021-05-01 RX ORDER — ACETAMINOPHEN 650 MG/1
650 SUPPOSITORY RECTAL EVERY 4 HOURS PRN
Status: DISCONTINUED | OUTPATIENT
Start: 2021-05-01 | End: 2021-05-06 | Stop reason: HOSPADM

## 2021-05-01 RX ORDER — SODIUM CHLORIDE AND POTASSIUM CHLORIDE 150; 900 MG/100ML; MG/100ML
INJECTION, SOLUTION INTRAVENOUS CONTINUOUS
Status: DISCONTINUED | OUTPATIENT
Start: 2021-05-01 | End: 2021-05-05

## 2021-05-01 RX ORDER — ACETAMINOPHEN 325 MG/1
650 TABLET ORAL EVERY 4 HOURS PRN
Status: DISCONTINUED | OUTPATIENT
Start: 2021-05-01 | End: 2021-05-06 | Stop reason: HOSPADM

## 2021-05-01 RX ORDER — POTASSIUM CHLORIDE 1500 MG/1
20 TABLET, EXTENDED RELEASE ORAL ONCE
Status: DISCONTINUED | OUTPATIENT
Start: 2021-05-01 | End: 2021-05-01

## 2021-05-01 RX ORDER — ONDANSETRON 4 MG/1
4 TABLET, ORALLY DISINTEGRATING ORAL EVERY 6 HOURS PRN
Status: DISCONTINUED | OUTPATIENT
Start: 2021-05-01 | End: 2021-05-06 | Stop reason: HOSPADM

## 2021-05-01 RX ADMIN — POTASSIUM CHLORIDE: 149 INJECTION, SOLUTION, CONCENTRATE INTRAVENOUS at 20:11

## 2021-05-01 RX ADMIN — POTASSIUM CHLORIDE 40 MEQ: 1.5 POWDER, FOR SOLUTION ORAL at 20:00

## 2021-05-01 RX ADMIN — POTASSIUM CHLORIDE AND SODIUM CHLORIDE: 900; 150 INJECTION, SOLUTION INTRAVENOUS at 23:48

## 2021-05-01 RX ADMIN — SODIUM CHLORIDE 1000 ML: 9 INJECTION, SOLUTION INTRAVENOUS at 19:11

## 2021-05-01 ASSESSMENT — ENCOUNTER SYMPTOMS
VOMITING: 0
DIARRHEA: 1
CONSTIPATION: 0
RECTAL PAIN: 1
FEVER: 1
FATIGUE: 1
CHILLS: 1

## 2021-05-01 ASSESSMENT — MIFFLIN-ST. JEOR: SCORE: 1203.24

## 2021-05-01 NOTE — ED TRIAGE NOTES
"Pt states that she has rectal pain; states that she feels she has to have a bowel movement.  States that she \"feels a bulge in her rectal area and a great deal of pain.\"  Hx of chron's disease.   "

## 2021-05-01 NOTE — ED PROVIDER NOTES
"  History   Chief Complaint:  Rectal/perineal Pain     HPI   Jacqueline Ruiz is a 55 year old female with history of Crohn's disease who presents with rectal pain.  Patient reports to the ED this evening from the GI clinic, with recommendation of a CT scan without contrast.  Patient explains that for the past 6 to 7 months she has been experiencing rectal pressure, rectal pain, and constant diarrhea.  Her diarrhea has gotten so bad to the point where she does not eat very much, and it is all yellow and clear.  She also mentions that she has an increased urgency for bowel movements, however when she tries to make a bowel movement nothing will come out.  She explains that her pain is shortly relieved after apparently passing a bowel movement, however returns relatively quickly.  She also endorses fatigue, chills, and fevers of 99 degrees.  No vomiting.  She explains that about 2 weeks ago she was treated for a tooth infection, and was on amoxicillin for 4 days, however stopped after 4 days due to her diarrhea getting much worse.    Review of Systems   Constitutional: Positive for chills, fatigue and fever.   Gastrointestinal: Positive for diarrhea and rectal pain. Negative for constipation and vomiting.   All other systems reviewed and are negative.    Allergies:  Iodine  Azathioprine  Ciprofloxacin  Infliximab  Morphine  Sulfa Drugs  Vicodin  Contrast Dye    Medications:  Humira     Past Medical History:    Crohn's disease  Gilbert syndrome  Iron deficiency anemia  Vertigo    Family History:    Heart disease (Father)  Hyperlipidemia (Father)  Hypertension (Father)    Social History:  Presents to ED with .  PCP: Vance Good    Physical Exam     Patient Vitals for the past 24 hrs:   BP Temp Temp src Pulse Resp SpO2 Height Weight   05/01/21 1838 113/68 -- -- 97 17 98 % -- --   05/01/21 1630 124/77 99.4  F (37.4  C) Oral 87 16 99 % 1.651 m (5' 5\") 61.2 kg (135 lb)       Physical Exam  Nursing note and vitals " reviewed.  Constitutional:  Appears well-developed and well-nourished.   HENT:   Head:    Atraumatic.   Mouth/Throat:   Oropharynx is clear and moist. No oropharyngeal exudate.   Eyes:    Pupils are equal, round, and reactive to light.   Neck:    Normal range of motion. Neck supple.      No tracheal deviation present. No thyromegaly present.   Cardiovascular:  Normal rate, regular rhythm, no murmur   Pulmonary/Chest: Breath sounds are clear and equal without wheezes or crackles.  Abdominal:   Soft. Bowel sounds are normal. Exhibits no distension and      no mass. There is no tenderness.      There is no rebound and no guarding.   Anus:                          Anal skin tags and small non thrombosed external hemorrhoids  Musculoskeletal:  Exhibits no edema.   Lymphadenopathy:  No cervical adenopathy.   Neurological:   Alert and oriented to person, place, and time.   Skin:    Skin is warm and dry. No rash noted. No pallor.       Emergency Department Course   Imaging:  CT Abdomen Pelvis w/o Contrast  1.  Diffuse bowel wall thickening throughout the colon, most prominent  in the sigmoid colon and rectum. Findings suggest a nonspecific  proctocolitis, most likely infectious or inflammatory in etiology.  2.  Nonobstructing 0.3 cm stone in the lower pole of the right kidney.  Reading per radiology     Laboratory:  CBC: WBC 10.1, HGB 12.6,   CMP: Potassium 2.6 (L) o/w WNL (Creatinine 0.65)    CRP Inflammation: <2.9    Asymptomatic COVID-19 virus (Coronavirus) by PCR In process     Emergency Department Course:  Reviewed:  1830 I reviewed the patient's nursing notes, vitals, past medical records, Care Everywhere.     Assessments:  1832 I performed an exam of the patient as documented above.   1953 I rechecked the patient's symptoms, discussed the patient's lab results with them, as well as plan of care and admission.    Consults:   2000 I spoke with Dr. Belcher of the GI service at [facility] regarding the  patient's symptoms and treatment options.   2012 I spoke with Dr. Levy of the hospitalist service at [facility] regarding the patient's symptoms and treatment options.     Interventions:  1911 NS 1,000 mL IV     Disposition:  The patient was admitted to the hospital under the care of Dr. Levy.     Impression & Plan   Medical Decision Making:  Jacqueline Ruiz is a 55 year old female with history of Crohn's disease who presents with rectal pain.  I found the patient to have diffuse colitis most prominent in the rectum and sigmoid likely due to Crohn's disease exacerbation with hypokalemia likely due to p.o. intake and diarrhea.  She also has diarrhea and has been on recent antibiotics so I ordered stool studies.  I consulted her [facility] Dr. Belcher who requested she be kept in n.p.o. after midnight for potential sigmoidoscopy procedure tomorrow for further evaluation.  She was IV hydrated here and potassium was replaced.  She is not having any signs of sepsis or other bacterial infection.    Covid-19  Jacqueline Ruiz was evaluated during a global COVID-19 pandemic, which necessitated consideration that the patient might be at risk for infection with the SARS-CoV-2 virus that causes COVID-19.   Applicable protocols for evaluation were followed during the patient's care.   COVID-19 was considered as part of the patient's evaluation. The plan for testing is:  a test was obtained during this visit.    Diagnosis:    ICD-10-CM    1. Colitis  K52.9    2. Hypokalemia  E87.6    3. Crohn's disease of small intestine with other complication (H)  K50.018    4. Diarrhea, unspecified type  R19.7        Scribe Disclosure:  YOGESH, Yvon Hill, am serving as a scribe at 6:38 PM on 5/1/2021 to document services personally performed by Becky Muhammad MD based on my observations and the provider's statements to me.      Becky Muhammad MD  05/02/21 1731

## 2021-05-02 LAB
ANION GAP SERPL CALCULATED.3IONS-SCNC: 3 MMOL/L (ref 3–14)
BUN SERPL-MCNC: 4 MG/DL (ref 7–30)
C COLI+JEJUNI+LARI FUSA STL QL NAA+PROBE: NOT DETECTED
CALCIUM SERPL-MCNC: 8.1 MG/DL (ref 8.5–10.1)
CHLORIDE SERPL-SCNC: 113 MMOL/L (ref 94–109)
CO2 SERPL-SCNC: 26 MMOL/L (ref 20–32)
CREAT SERPL-MCNC: 0.59 MG/DL (ref 0.52–1.04)
CRP SERPL-MCNC: <2.9 MG/L (ref 0–8)
EC STX1 GENE STL QL NAA+PROBE: NOT DETECTED
EC STX2 GENE STL QL NAA+PROBE: NOT DETECTED
ENTERIC PATHOGEN COMMENT: NORMAL
ERYTHROCYTE [SEDIMENTATION RATE] IN BLOOD BY WESTERGREN METHOD: 16 MM/H (ref 0–30)
FLEXIBLE SIGMOIDOSCOPY: NORMAL
GFR SERPL CREATININE-BSD FRML MDRD: >90 ML/MIN/{1.73_M2}
GLUCOSE SERPL-MCNC: 91 MG/DL (ref 70–99)
NOROV GI+II ORF1-ORF2 JNC STL QL NAA+PR: NOT DETECTED
POTASSIUM SERPL-SCNC: 3.4 MMOL/L (ref 3.4–5.3)
POTASSIUM SERPL-SCNC: 3.4 MMOL/L (ref 3.4–5.3)
POTASSIUM SERPL-SCNC: 3.6 MMOL/L (ref 3.4–5.3)
RVA NSP5 STL QL NAA+PROBE: NOT DETECTED
SALMONELLA SP RPOD STL QL NAA+PROBE: NOT DETECTED
SHIGELLA SP+EIEC IPAH STL QL NAA+PROBE: NOT DETECTED
SODIUM SERPL-SCNC: 142 MMOL/L (ref 133–144)
V CHOL+PARA RFBL+TRKH+TNAA STL QL NAA+PR: NOT DETECTED
Y ENTERO RECN STL QL NAA+PROBE: NOT DETECTED

## 2021-05-02 PROCEDURE — 0DJD8ZZ INSPECTION OF LOWER INTESTINAL TRACT, VIA NATURAL OR ARTIFICIAL OPENING ENDOSCOPIC: ICD-10-PCS | Performed by: INTERNAL MEDICINE

## 2021-05-02 PROCEDURE — 250N000013 HC RX MED GY IP 250 OP 250 PS 637: Performed by: INTERNAL MEDICINE

## 2021-05-02 PROCEDURE — 85652 RBC SED RATE AUTOMATED: CPT | Performed by: INTERNAL MEDICINE

## 2021-05-02 PROCEDURE — G0500 MOD SEDAT ENDO SERVICE >5YRS: HCPCS | Performed by: INTERNAL MEDICINE

## 2021-05-02 PROCEDURE — 250N000011 HC RX IP 250 OP 636: Performed by: INTERNAL MEDICINE

## 2021-05-02 PROCEDURE — 84132 ASSAY OF SERUM POTASSIUM: CPT | Performed by: INTERNAL MEDICINE

## 2021-05-02 PROCEDURE — 99207 PR CDG-CODE CATEGORY CHANGED: CPT | Performed by: INTERNAL MEDICINE

## 2021-05-02 PROCEDURE — 45330 DIAGNOSTIC SIGMOIDOSCOPY: CPT | Performed by: INTERNAL MEDICINE

## 2021-05-02 PROCEDURE — 80048 BASIC METABOLIC PNL TOTAL CA: CPT | Performed by: INTERNAL MEDICINE

## 2021-05-02 PROCEDURE — 99225 PR SUBSEQUENT OBSERVATION CARE,LEVEL II: CPT | Performed by: INTERNAL MEDICINE

## 2021-05-02 PROCEDURE — 96361 HYDRATE IV INFUSION ADD-ON: CPT

## 2021-05-02 PROCEDURE — G0378 HOSPITAL OBSERVATION PER HR: HCPCS

## 2021-05-02 PROCEDURE — 36415 COLL VENOUS BLD VENIPUNCTURE: CPT | Performed by: INTERNAL MEDICINE

## 2021-05-02 PROCEDURE — 86140 C-REACTIVE PROTEIN: CPT | Performed by: INTERNAL MEDICINE

## 2021-05-02 PROCEDURE — 99153 MOD SED SAME PHYS/QHP EA: CPT | Performed by: INTERNAL MEDICINE

## 2021-05-02 RX ORDER — FENTANYL CITRATE 50 UG/ML
25-50 INJECTION, SOLUTION INTRAMUSCULAR; INTRAVENOUS EVERY 5 MIN PRN
Status: ACTIVE | OUTPATIENT
Start: 2021-05-02 | End: 2021-05-03

## 2021-05-02 RX ORDER — NALOXONE HYDROCHLORIDE 0.4 MG/ML
0.2 INJECTION, SOLUTION INTRAMUSCULAR; INTRAVENOUS; SUBCUTANEOUS
Status: DISCONTINUED | OUTPATIENT
Start: 2021-05-02 | End: 2021-05-06 | Stop reason: HOSPADM

## 2021-05-02 RX ORDER — FENTANYL CITRATE 50 UG/ML
INJECTION, SOLUTION INTRAMUSCULAR; INTRAVENOUS PRN
Status: COMPLETED | OUTPATIENT
Start: 2021-05-02 | End: 2021-05-02

## 2021-05-02 RX ORDER — POTASSIUM CHLORIDE 1.5 G/1.58G
20 POWDER, FOR SOLUTION ORAL ONCE
Status: COMPLETED | OUTPATIENT
Start: 2021-05-02 | End: 2021-05-02

## 2021-05-02 RX ORDER — FENTANYL CITRATE 50 UG/ML
50 INJECTION, SOLUTION INTRAMUSCULAR; INTRAVENOUS
Status: ACTIVE | OUTPATIENT
Start: 2021-05-02 | End: 2021-05-03

## 2021-05-02 RX ORDER — LIDOCAINE 40 MG/G
CREAM TOPICAL
Status: CANCELLED | OUTPATIENT
Start: 2021-05-02

## 2021-05-02 RX ORDER — FLUMAZENIL 0.1 MG/ML
0.2 INJECTION, SOLUTION INTRAVENOUS
Status: ACTIVE | OUTPATIENT
Start: 2021-05-02 | End: 2021-05-03

## 2021-05-02 RX ORDER — POTASSIUM CHLORIDE 1.5 G/1.58G
40 POWDER, FOR SOLUTION ORAL ONCE
Status: COMPLETED | OUTPATIENT
Start: 2021-05-02 | End: 2021-05-02

## 2021-05-02 RX ORDER — NALOXONE HYDROCHLORIDE 0.4 MG/ML
0.4 INJECTION, SOLUTION INTRAMUSCULAR; INTRAVENOUS; SUBCUTANEOUS
Status: DISCONTINUED | OUTPATIENT
Start: 2021-05-02 | End: 2021-05-06 | Stop reason: HOSPADM

## 2021-05-02 RX ADMIN — POTASSIUM CHLORIDE 20 MEQ: 1.5 POWDER, FOR SOLUTION ORAL at 16:51

## 2021-05-02 RX ADMIN — POTASSIUM CHLORIDE AND SODIUM CHLORIDE: 900; 150 INJECTION, SOLUTION INTRAVENOUS at 16:38

## 2021-05-02 RX ADMIN — POTASSIUM CHLORIDE AND SODIUM CHLORIDE: 900; 150 INJECTION, SOLUTION INTRAVENOUS at 08:35

## 2021-05-02 RX ADMIN — FENTANYL CITRATE 50 MCG: 50 INJECTION, SOLUTION INTRAMUSCULAR; INTRAVENOUS at 15:18

## 2021-05-02 RX ADMIN — POTASSIUM CHLORIDE 40 MEQ: 1.5 POWDER, FOR SOLUTION ORAL at 00:20

## 2021-05-02 RX ADMIN — POTASSIUM CHLORIDE 20 MEQ: 1.5 POWDER, FOR SOLUTION ORAL at 08:28

## 2021-05-02 RX ADMIN — MIDAZOLAM 1 MG: 1 INJECTION INTRAMUSCULAR; INTRAVENOUS at 15:19

## 2021-05-02 ASSESSMENT — MIFFLIN-ST. JEOR: SCORE: 1195.3

## 2021-05-02 NOTE — PROGRESS NOTES
RECEIVING UNIT ED HANDOFF REVIEW    ED Nurse Handoff Report was reviewed by: Melissa Medley on May 1, 2021 at 11:00 PM

## 2021-05-02 NOTE — PLAN OF CARE
[id number]: A&Ox4. VSS on RA. Up ind. Pt went for flexible sigmoidoscopy this afternoon but unable to complete procedure d/t anal stricture. Colorectal surgery consult placed, they will see pt tomorrow. Gretchen full liquid diet, to be NPO at 0000, pt aware. Denies pain and nausea. Potassium replaced this AM, recheck after procedure was 3.4; so replaced again this evening, next recheck 2215. IVF infusing.  at bedside. Continue to monitor.

## 2021-05-02 NOTE — PROGRESS NOTES
Observation goals PRIOR TO DISCHARGE    Comments:   -diagnostic tests and consults completed and resulted : Not met     -vital signs normal or at patient baseline : Partially met. Soft BP     Nurse to notify provider when observation goals have been met and patient is ready for discharge.

## 2021-05-02 NOTE — CONSULTS
[facility] Consultation Note     Patient Name: Jacqueline Ruiz      YOB: 1965 (Age: 55 year old)   Medical Record #: [id number]   Primary Physician: Vance Good   Admit Date/Time: 5/1/2021  6:28 PM     I was asked to see this patient by Dr. Levy for evaluation of colitis.     Impression & Plan     55 year old female with history of ileocolonic Crohn's disease status post multiple ileal resections on Humira every 2 weeks presenting with progressive tenesmus, diarrhea.  Inflammatory markers as an outpatient and inpatient have been low, but CT scan shows concern for continued inflammation.  Suspect that she has ongoing inflammation despite Humira every 2 weeks. Alternative causes include anal stenosis (noted on prior colonoscopies) or rectocele; both could cause rectal pressure with inability to evauate.     Flexible sigmoidoscopy today    Pending that, may need IV steroids followed by oral steroids    Long-term treatment will depend on Humira level and antibodies which were just ordered, drawn and are pending as an outpatient    QuantiFERON gold pending as outpatient as well.     Plan was communicated with primary care team. Thank you for allowing us to participate in the care of Jacqueline Ruiz. Please call with any questions or pertinent change in clinical status.     Valentina Belcher MD MS....................  5/2/2021     Cell [phone]  After 5 PM call [phone]    [facility]          History of Presenting Illness:    Jacqueline Ruiz is a 55 year old female with history of ileocolonic Crohn's disease status post multiple ileal resections admitted 5/1/2021 for progressive GI symptoms.  She has had more than 5 months of progressive GI symptoms.  Specifically she has a feeling of incomplete evacuation and pressure in her rectum associated with change in her stools meaning more diarrhea that alternates with constipation.  This has not been responsive to fiber  or laxatives.  The symptoms have changed from every 2 weeks to now daily.  Associated with this she has decreased her oral intake due to poor appetite.  Outpatient inflammatory markers including CRP and fecal calprotectin have been low.  Imaging and colonoscopy have been recommended as an outpatient, but not yet performed.  Due to progression in symptoms, recommendation was made for her to come to the emergency room for evaluation.  Labs show hypokalemia.  CRP again is normal.  However CT scan shows concern for pancolitis, with predominance of left-sided inflammation.    Review of Systems:   Review of Systems: As above.  Patient otherwise frustrated about ongoing symptoms.    Past medical history  Ileocolonic Crohn's disease, currently on every other week Humira    Past surgical history  Multiple ileal resections    Medications & Allergies     Medications Prior to Admission   Medication Sig Dispense Refill Last Dose     adalimumab (HUMIRA) 40 MG/0.8ML prefilled syringe kit Inject 40 mg Subcutaneous every 14 days   3/31/2021     Cholecalciferol (D3 PO) Take 8,000 Units by mouth daily   4/30/2021 at Unknown time     Lactobacillus Rhamnosus, GG, (RA PROBIOTIC DIGESTIVE CARE) CAPS Take 1 capsule by mouth daily   4/30/2021 at Unknown time       Current scheduled medications:     sodium chloride (PF)  3 mL Intracatheter Q8H     Current PRN medications:     acetaminophen, acetaminophen, lidocaine 4%, lidocaine (buffered or not buffered), melatonin, ondansetron **OR** ondansetron, sodium chloride (PF)    Allergies   Allergen Reactions     Iodine Anaphylaxis     Other [Seasonal Allergies] Anaphylaxis     IVP dye     Azathioprine Nausea     Ciprofloxacin Unknown     Infliximab      Other reaction(s): Flushing     Morphine      Sulfa Drugs      Vicodin [Hydrocodone-Acetaminophen]        Social & Family History   Social History:  reports that she has never smoked. She has never used smokeless tobacco. She reports that she does  "not drink alcohol.      Family History: family history is not on file.    Physical Exam   Physical Exam:   Vital signs:   Blood pressure 107/61, pulse 64, temperature 97.8  F (36.6  C), temperature source Oral, resp. rate 16, height 1.651 m (5' 5\"), weight 61.2 kg (135 lb), SpO2 98 %.  Estimated body mass index is 22.47 kg/m  as calculated from the following:    Height as of this encounter: 1.651 m (5' 5\").    Weight as of this encounter: 61.2 kg (135 lb).  General Appearance:   Alert, oriented, tearful  Eyes: No scleral icterus  HEENT: Atraumatic, normocephalic  Respiratory: Bilateral breath sounds  CV: Regular  GI: Soft  Musculoskeletal: Normal to low muscle mass for age and gender  Lymphatic: No edema  Skin/hair: No acute lesions  Neurologic: Nonfocal    Labs & Micro   Labs:   Recent Labs   Lab Test 05/01/21 1911 02/07/18 2010   WBC 10.1 10.0   HGB 12.6 13.2   MCV 84 82    321     Recent Labs   Lab Test 05/02/21 0538 05/01/21  2332 05/01/21 1911 02/07/18 2010   POTASSIUM 3.4 2.9* 2.6* 2.9*   CHLORIDE 113*  --  105 101   BUN 4*  --  7 6*     Recent Labs   Lab Test 05/01/21 1911   ALBUMIN 3.4   BILITOTAL 1.0   ALT 14   AST 10     Results for JUAN JHA (MRN [id number]) as of 5/2/2021 14:14   Ref. Range 5/2/2021 05:38   CRP Inflammation Latest Ref Range: 0.0 - 8.0 mg/L <2.9       Imaging     noncontrast ct scan yesterday                                                           IMPRESSION:   1.  Diffuse bowel wall thickening throughout the colon, most prominent  in the sigmoid colon and rectum. Findings suggest a nonspecific  proctocolitis, most likely infectious or inflammatory in etiology.  2.  Nonobstructing 0.3 cm stone in the lower pole of the right kidney.    Endoscopies     Colonoscopy 5/2019 [facility]   Findings:   Multiple aphthous ulcerations seen in the ileum, anastomosis and rectum.  Post surgical anatomy:     -ileo-colonic anastomosis   Location(s):    -hepatic flexure  Anal canal:  " external hemorrhoid(s)  Random biopsies were taken throughout the colon to rule out microscopic colitis.    Impression:  Crohn's disease of both small and large intestine without complication    Preliminary Plan:  Repeat colonoscopy in 3 years  Recommendation Comments:  Given the increase in ileal inflammation and active jose-anal disease, need either Enyvio or Humira.  Will check labs today before starting therapy.  Pathology Results:  A: COLON, RANDOM, BIOPSY:           1. Minimally active chronic colitis with erosion consistent with Crohn's disease           2. Negative for dysplasia

## 2021-05-02 NOTE — H&P
Admitted: 05/01/2021    DATE OF SERVICE: 05/01/2021.    CHIEF COMPLAINT:  Rectal pain.    HISTORY OF PRESENT ILLNESS:  Had been obtained from the patient who is a good historian.  I discussed with the ER attending, Dr. Muhammad, and I reviewed her chart as well.    Ms. Jacqueline Ruiz is an extremely pleasant 55-year-old female with a past medical history of Crohn's disease, status post multiple bowel resections, currently on Humira, who presented for evaluation of rectal pain and diarrhea.    She follows with [facility].  She said that she was diagnosed with Crohn's disease when she was 19 years old. She had multiple bowel resections.  She is currently on Humira.  She states that for the last 6-7 months, she has been having rectal pain and diarrhea. She describes having rectal pain and pressure before having a bowel movement.  Many times, she has only a very small, loose stool and rectal pain persists.  She has to go to the bathroom every 10 minutes, sometimes, until she eventually has a larger bowel movement and her pain improves for a while, but later on, she starts having rectal pain again.  She denies any abdominal pain.  She denies any bloody stools.  She denies any nausea or vomiting.  She reports having low-grade fevers at home, recently 99 degrees Fahrenheit.  She was supposed to have a colonoscopy as outpatient, but she postponed it because she had a recent tooth infection and extraction.  Of note, when she had the tooth infection, she had been on amoxicillin, which made her diarrhea worse.  She took antibiotic only for 4 days and eventually her diarrhea slightly improved.  Upon further questioning, she states that she actually tries to avoid eating because she avoids to have a bowel movement, because of rectal pain.  She denies any chest pain or shortness of breath.  No headache, no dizziness, no dysuria, no leg swellings.    She states that she called [facility] today, and she was advised to come to the  ER to have a CAT scan and possible sigmoidoscopy.    In ER, she was seen by Dr. Muhammad.  Her initial vitals showed blood pressure of 124/77, heart rate 87, temperature 99.4, respiratory rate 16, oxygen saturation 99% on room air.  She had basic blood work, which showed a BMP with sodium of 138, potassium 2.6, chloride 105, bicarbonate 29, BUN 7.  Calcium was 8.7, anion gap of 4, albumin 3.4, total protein 7.1, total bilirubin 1, alkaline phosphatase 54, ALT 14, AST 10.  CRP less than 2.9.  Glucose is 91.  CBC with white blood cells 10.1, hemoglobin 12.6, hematocrit 39.2 and platelet count 410.  She had a CT of the abdomen and pelvis without contrast, which showed diffuse bowel wall thickening throughout the colon, most prominent in the sigmoid colon and rectum that suggested nonspecific proctocolitis, most likely infectious or inflammatory in etiology.  A nonobstructing 0.3 cm stone in the lower pole of the right kidney noted.    In ER, she was given 1 dose of potassium chloride 40 mEq p.o. and a bolus of normal saline.  Stool sample for C. diff and enteric bacteria sent to the lab, in process at this time.    PAST MEDICAL HISTORY:    1.  Crohn's disease as mentioned above. She had been followed by [facility].  She had multiple bowel resections in the past.  Currently, she is on Humira infusion.  She states that she missed the last infusion because she had a tooth infection.    2.  Gilbert's syndrome.    PAST SURGICAL HISTORY:     1.  Multiple bowel resections.  2.  Appendectomy.  3.  Cholecystectomy.    SOCIAL HISTORY:  She denies smoking.  She denies alcohol drinking.  She denies illicit drug abuse.    FAMILY HISTORY:      Heart Disease Father 67 MI at 49   Hyperlipidemia Father 67     Hypertension Father 67     Other Maternal Grandfather   Hodgkin's disease   Leukemia Mother 64       Relation Name Status Comments   Brother Adopted Alive     Father 67 Alive        PRIOR TO ADMISSION MEDICATIONS:   adalimumab  (HUMIRA) 40 MG/0.8ML prefilled syringe kit Inject 40 mg Subcutaneous every 14 days 3/31/2021 Yes Unknown, Entered By History   Cholecalciferol (D3 PO) Take 8,000 Units by mouth daily 4/30/2021 at Unknown time Yes Unknown, Entered By History   Lactobacillus Rhamnosus, GG, (RA PROBIOTIC DIGESTIVE CARE) CAPS Take 1 capsule by mouth daily 4/30/2021 at Unknown time Yes Unknown, Entered By History         ALLERGIES:    Allergies   Allergen Reactions     Iodine Anaphylaxis     Other [Seasonal Allergies] Anaphylaxis     IVP dye     Azathioprine Nausea     Ciprofloxacin Unknown     Infliximab      Other reaction(s): Flushing     Morphine Nausea and Vomiting     Sulfa Drugs      Vicodin [Hydrocodone-Acetaminophen]      Migraine         REVIEW OF SYSTEMS:  A 10-point review of systems was conducted and it was negative, except for pertinent positives mentioned in history of present illness.    PHYSICAL EXAMINATION:    VITAL SIGNS:  Blood pressure is 113/68, heart rate 98, respiratory rate 16, oxygen saturation 99% on room air, temperature 99.4.  GENERAL:  The patient is awake, alert, no acute distress at the time of my examination.  HEENT:  Head is normocephalic, atraumatic.  Pupils are equally round and reactive to light.  Oral mucosa is moist.  NECK:  Supple.  No cervical lymphadenopathy, no thyromegaly.   CHEST:  There is bilateral air entry.  No wheezing, no rales, no crackles.  CARDIOVASCULAR:  There is normal S1 and S2.  Regular rate.  There are no murmurs, no rubs.  ABDOMEN:  Soft, nontender, nondistended.  Bowel sounds are present.  EXTREMITIES:  There is no leg swelling, no calf tenderness. 2+ peripheral pulses are palpable.    SKIN:  Intact.  No rash, no cyanosis.  NEUROLOGIC:  The patient is awake, alert, oriented to self, place and time.  There are no focal neurological deficits.  PSYCHIATRIC:  Normal mood, normal affect, very pleasant.  MUSCULOSKELETAL:  She moves all extremities freely.  There are no obvious joint  deformities.    LABORATORY DATA:  Reviewed and dictated above.    IMPRESSION AND PLAN:  Ms. Jacqueline Ruiz is an extremely pleasant 55-year-old female with a past medical history of Crohn's disease, currently on Humira, who presented for evaluation of rectal pain and diarrhea.    PLAN:     1.  Crohn's disease flareup.  2.  Proctocolitis.  -- She has long history of Crohn's disease.  She has had multiple bowel resections in the past.  She follows with [facility].  Currently, she is on Humira.  She did miss last infusion of Humira 2 weeks ago because of recent tooth infection.  She presented because of ongoing rectal pain and the diarrhea for the last 6-7 months.  [facility] recommended her to present to the hospital and have a CAT scan of the abdomen and pelvis, which was done in ER and showed diffuse bowel wall thickening throughout the colon, most prominent in the sigmoid colon and the rectum, suggesting nonspecific proctocolitis.  Stool sample to rule out Clostridium difficile had been sent, given the fact that she recently used antibiotics.  Also, stool sample for enteric bacteria pending at this time.   recommended full liquids for tonight and n.p.o. after midnight with plan for flexible sigmoidoscopy tomorrow.  She will be started on IV fluids and antiemetics p.r.n.  [facility] consult requested.  Of note,  did not recommend to start steroids tonight.  3.  Hypokalemia:  Potassium is 2.6.  This is likely due to GI losses.  She did receive 1 dose of potassium chloride 40 mEq in ER.  We will repeat a potassium level at 11 p.m. and potassium replacement protocol has been ordered.  4.  Deep venous thrombosis prophylaxis: Encourage ambulation as I anticipate a short hospital stay.    5.  Code status was discussed with the patient.  The patient is FULL CODE.   6.  Disposition: Admitted under observational status.  I anticipate the patient will be discharged in the next 24-36 hours, pending GI workup and  recommendations.    Odessa Levy MD        D: 2021   T: 2021   MT: [id number]    Name:     ROWENARAUDELJUAN JULISA  MRN:      [id number]        Account:     [id number]   :      1965           Admitted:    2021       Document: [id number]

## 2021-05-02 NOTE — PROGRESS NOTES
PRIMARY DIAGNOSIS: GASTROENTERITIS    OUTPATIENT/OBSERVATION GOALS TO BE MET BEFORE DISCHARGE  1. Orthostatic performed: N/A    2. Tolerating PO fluid and/or antibiotics (if applicable): No    3. Nausea/Vomiting/Diarrhea symptoms improved: No,  loose and yellow    4. Pain status: Pain free.    5. Return to near baseline physical activity: No    Pt is A&Ox4. VSS on RA ex hypotensive at times. NPO since midnight. Up w/ SBA. Denies pain. Hasn't had a BM since arriving to the floor. PIV infusing at 100ml/hr. K+ replaced and came back at 2.9- was replaced a second time, recheck is 3.4. At 0630 pt complained that her hand went numb and has been numb for the past 20 mins, gave pt a ice pack and had her change positions. Plan for [facility] to consult pt and possibly have a sigmoidoscopy later today. Discharge pending progress.

## 2021-05-02 NOTE — H&P (VIEW-ONLY)
[facility] Consultation Note     Patient Name: Jacqueline Ruiz      YOB: 1965 (Age: 55 year old)   Medical Record #: [id number]   Primary Physician: Vance Good   Admit Date/Time: 5/1/2021  6:28 PM     I was asked to see this patient by Dr. Levy for evaluation of colitis.     Impression & Plan     55 year old female with history of ileocolonic Crohn's disease status post multiple ileal resections on Humira every 2 weeks presenting with progressive tenesmus, diarrhea.  Inflammatory markers as an outpatient and inpatient have been low, but CT scan shows concern for continued inflammation.  Suspect that she has ongoing inflammation despite Humira every 2 weeks. Alternative causes include anal stenosis (noted on prior colonoscopies) or rectocele; both could cause rectal pressure with inability to evauate.     Flexible sigmoidoscopy today    Pending that, may need IV steroids followed by oral steroids    Long-term treatment will depend on Humira level and antibodies which were just ordered, drawn and are pending as an outpatient    QuantiFERON gold pending as outpatient as well.     Plan was communicated with primary care team. Thank you for allowing us to participate in the care of Jacqueline Ruiz. Please call with any questions or pertinent change in clinical status.     Valentina Belcher MD MS....................  5/2/2021     Cell [phone]  After 5 PM call [phone]    [facility]          History of Presenting Illness:    Jacqueline Ruiz is a 55 year old female with history of ileocolonic Crohn's disease status post multiple ileal resections admitted 5/1/2021 for progressive GI symptoms.  She has had more than 5 months of progressive GI symptoms.  Specifically she has a feeling of incomplete evacuation and pressure in her rectum associated with change in her stools meaning more diarrhea that alternates with constipation.  This has not been responsive to fiber  or laxatives.  The symptoms have changed from every 2 weeks to now daily.  Associated with this she has decreased her oral intake due to poor appetite.  Outpatient inflammatory markers including CRP and fecal calprotectin have been low.  Imaging and colonoscopy have been recommended as an outpatient, but not yet performed.  Due to progression in symptoms, recommendation was made for her to come to the emergency room for evaluation.  Labs show hypokalemia.  CRP again is normal.  However CT scan shows concern for pancolitis, with predominance of left-sided inflammation.    Review of Systems:   Review of Systems: As above.  Patient otherwise frustrated about ongoing symptoms.    Past medical history  Ileocolonic Crohn's disease, currently on every other week Humira    Past surgical history  Multiple ileal resections    Medications & Allergies     Medications Prior to Admission   Medication Sig Dispense Refill Last Dose     adalimumab (HUMIRA) 40 MG/0.8ML prefilled syringe kit Inject 40 mg Subcutaneous every 14 days   3/31/2021     Cholecalciferol (D3 PO) Take 8,000 Units by mouth daily   4/30/2021 at Unknown time     Lactobacillus Rhamnosus, GG, (RA PROBIOTIC DIGESTIVE CARE) CAPS Take 1 capsule by mouth daily   4/30/2021 at Unknown time       Current scheduled medications:     sodium chloride (PF)  3 mL Intracatheter Q8H     Current PRN medications:     acetaminophen, acetaminophen, lidocaine 4%, lidocaine (buffered or not buffered), melatonin, ondansetron **OR** ondansetron, sodium chloride (PF)    Allergies   Allergen Reactions     Iodine Anaphylaxis     Other [Seasonal Allergies] Anaphylaxis     IVP dye     Azathioprine Nausea     Ciprofloxacin Unknown     Infliximab      Other reaction(s): Flushing     Morphine      Sulfa Drugs      Vicodin [Hydrocodone-Acetaminophen]        Social & Family History   Social History:  reports that she has never smoked. She has never used smokeless tobacco. She reports that she does  "not drink alcohol.      Family History: family history is not on file.    Physical Exam   Physical Exam:   Vital signs:   Blood pressure 107/61, pulse 64, temperature 97.8  F (36.6  C), temperature source Oral, resp. rate 16, height 1.651 m (5' 5\"), weight 61.2 kg (135 lb), SpO2 98 %.  Estimated body mass index is 22.47 kg/m  as calculated from the following:    Height as of this encounter: 1.651 m (5' 5\").    Weight as of this encounter: 61.2 kg (135 lb).  General Appearance:   Alert, oriented, tearful  Eyes: No scleral icterus  HEENT: Atraumatic, normocephalic  Respiratory: Bilateral breath sounds  CV: Regular  GI: Soft  Musculoskeletal: Normal to low muscle mass for age and gender  Lymphatic: No edema  Skin/hair: No acute lesions  Neurologic: Nonfocal    Labs & Micro   Labs:   Recent Labs   Lab Test 05/01/21 1911 02/07/18 2010   WBC 10.1 10.0   HGB 12.6 13.2   MCV 84 82    321     Recent Labs   Lab Test 05/02/21 0538 05/01/21  2332 05/01/21 1911 02/07/18 2010   POTASSIUM 3.4 2.9* 2.6* 2.9*   CHLORIDE 113*  --  105 101   BUN 4*  --  7 6*     Recent Labs   Lab Test 05/01/21 1911   ALBUMIN 3.4   BILITOTAL 1.0   ALT 14   AST 10     Results for JUAN JHA (MRN [id number]) as of 5/2/2021 14:14   Ref. Range 5/2/2021 05:38   CRP Inflammation Latest Ref Range: 0.0 - 8.0 mg/L <2.9       Imaging     noncontrast ct scan yesterday                                                           IMPRESSION:   1.  Diffuse bowel wall thickening throughout the colon, most prominent  in the sigmoid colon and rectum. Findings suggest a nonspecific  proctocolitis, most likely infectious or inflammatory in etiology.  2.  Nonobstructing 0.3 cm stone in the lower pole of the right kidney.    Endoscopies     Colonoscopy 5/2019 [facility]   Findings:   Multiple aphthous ulcerations seen in the ileum, anastomosis and rectum.  Post surgical anatomy:     -ileo-colonic anastomosis   Location(s):    -hepatic flexure  Anal canal:  " external hemorrhoid(s)  Random biopsies were taken throughout the colon to rule out microscopic colitis.    Impression:  Crohn's disease of both small and large intestine without complication    Preliminary Plan:  Repeat colonoscopy in 3 years  Recommendation Comments:  Given the increase in ileal inflammation and active jose-anal disease, need either Enyvio or Humira.  Will check labs today before starting therapy.  Pathology Results:  A: COLON, RANDOM, BIOPSY:           1. Minimally active chronic colitis with erosion consistent with Crohn's disease           2. Negative for dysplasia

## 2021-05-02 NOTE — OR NURSING
Patient in endoscopy for flexible sigmoidoscopy.  Unable to complete procedure d/t anal stricture.  Consult for colon-rectal surgeon will be placed.  Patient can have full liquid diet.  IV in left forearm symptomatic for infiltration.  A new 20 ga PIV was placed in right forearm.  Report given at bedside to observation RN.

## 2021-05-02 NOTE — PROGRESS NOTES
[facility]    Hospitalist Progress Note    Brief Summary:  Ms. Jacqueline Ruiz is an extremely pleasant 55-year-old female with a past medical history of Crohn's disease, currently on Humira, who presented for evaluation of rectal pain and diarrhea.    Assessment & Plan        Crohn's disease flareup.   Proctocolitis.  Patient with history of Crohn's disease, multiple bowel resection, no come with worsening rectal pain and diarrhea, no blood or mucus, going for few months, get worse in the last 2 weeks, she did missed her dose of Humira because of dental infection. Now daily symptoms. Check ESR and CRP    CT abdomen and pelvis on admission shows diffuse bowel wall thickening throughout the colon, most prominent in the sigmoid and rectum, suggestive of proctocolitis. C diff and enteric bacterial and viral panel negative. I think this is secondary to her Crohn's disease and now worse as she missed the dose of her Humira.     [facility] is consulted as she is following with them, plan is do do the sigmoidoscopy today, biopsies and start her on most likely steroids locally vs systemic will defer it to the GI.   Overall stable at this time, [facility] evaluation is pending.       Hypokalemia:  Potassium is 2.6 on admission now replaced, keep on electrolytes replacement protocol          DVT Prophylaxis: Pneumatic Compression Devices  Code Status: Full Code    Disposition: Expected discharge in 1 day.    Mode Trejo MD  Text Page  (7am - 6pm)    Interval History   Has rectal pain before BM and having loose stools, no blood or mucus.   Low grade temp of 99 this morning, no abdominal pain, nausea, vomiting, headache or dizziness.     No other significant event overnight.     -Data reviewed today: I reviewed all new labs and imaging results over the last 24 hours. I personally reviewed no images or EKG's today.    Physical Exam   Temp: 98.4  F (36.9  C) Temp src: Oral BP: 102/59 Pulse: 70   Resp: 16 SpO2: 98 %  O2 Device: None (Room air)    Vitals:    05/01/21 1630   Weight: 61.2 kg (135 lb)     Vital Signs with Ranges  Temp:  [98.4  F (36.9  C)-99.8  F (37.7  C)] 98.4  F (36.9  C)  Pulse:  [70-97] 70  Resp:  [16-17] 16  BP: ()/(55-77) 102/59  SpO2:  [97 %-99 %] 98 %  No intake/output data recorded.    Constitutional: awake, alert, cooperative, no apparent distress, and appears stated age  Eyes: Lids and lashes normal, pupils equal, round and reactive to light, extra ocular muscles intact, sclera clear, conjunctiva normal  Respiratory: No increased work of breathing, good air exchange, clear to auscultation bilaterally, no crackles or wheezing  Cardiovascular: Normal apical impulse, regular rate and rhythm, normal S1 and S2, no S3 or S4, and no murmur noted  GI: No scars, normal bowel sounds, soft, non-distended, non-tender, no masses palpated, no hepatosplenomegally  Musculoskeletal: no lower extremity pitting edema present  Neurologic: no focal deficit.     Medications     0.9% sodium chloride + KCl 20 mEq/L 100 mL/hr at 05/02/21 0835       sodium chloride (PF)  3 mL Intracatheter Q8H       Data   Recent Labs   Lab 05/02/21  0538 05/01/21  2332 05/01/21  1911   WBC  --   --  10.1   HGB  --   --  12.6   MCV  --   --  84   PLT  --   --  410     --  138   POTASSIUM 3.4 2.9* 2.6*   CHLORIDE 113*  --  105   CO2 26  --  29   BUN 4*  --  7   CR 0.59  --  0.65   ANIONGAP 3  --  4   TRACIE 8.1*  --  8.7   GLC 91  --  91   ALBUMIN  --   --  3.4   PROTTOTAL  --   --  7.1   BILITOTAL  --   --  1.0   ALKPHOS  --   --  54   ALT  --   --  14   AST  --   --  10       Recent Results (from the past 24 hour(s))   CT Abdomen Pelvis w/o Contrast    Narrative    CT ABDOMEN AND PELVIS WITHOUT CONTRAST 5/1/2021 7:20 PM    CLINICAL HISTORY: Diarrhea. Rectal pain.  TECHNIQUE: CT scan of the abdomen and pelvis was performed without IV  contrast. Multiplanar reformats were obtained. Dose reduction  techniques were used.  CONTRAST:  None.  COMPARISON: CT of the abdomen and pelvis performed 2/7/2018.    FINDINGS:   LOWER CHEST: The visualized lung bases are clear.    HEPATOBILIARY: Prior cholecystectomy. 1.7 cm cyst in the posterior  segment of the right hepatic lobe, not significantly changed. No other  hepatic masses are seen.    PANCREAS: Normal.    SPLEEN: Normal.    ADRENAL GLANDS: Normal.    KIDNEYS/BLADDER: Nonobstructing stone in the lower pole of the right  kidney measures 0.3 cm, and is new since the previous exam. No other  urinary calculi are identified. No hydronephrosis.    BOWEL: Mild colonic bowel wall thickening throughout the colon is most  prominent in the sigmoid colon and rectum, findings suggest a  nonspecific proctocolitis. No bowel obstruction. Postoperative changes  are noted involving the ileocecal region. The appendix is not seen,  and may be surgically absent. No perianal fluid collections are  identified.    PELVIC ORGANS: Unremarkable.    LYMPH NODES: No enlarged lymph nodes are identified in the abdomen or  pelvis.    VASCULATURE: Mild atherosclerotic aortoiliac calcification.    ADDITIONAL FINDINGS: None.    MUSCULOSKELETAL: Lumbar curve, convex right.      Impression    IMPRESSION:   1.  Diffuse bowel wall thickening throughout the colon, most prominent  in the sigmoid colon and rectum. Findings suggest a nonspecific  proctocolitis, most likely infectious or inflammatory in etiology.  2.  Nonobstructing 0.3 cm stone in the lower pole of the right kidney.    JIMMY SANDOVAL MD

## 2021-05-02 NOTE — PROGRESS NOTES
PRIMARY DIAGNOSIS: GASTROENTERITIS    OUTPATIENT/OBSERVATION GOALS TO BE MET BEFORE DISCHARGE  1. Orthostatic performed: N/A    2. Tolerating PO fluid and/or antibiotics (if applicable): No    3. Nausea/Vomiting/Diarrhea symptoms improved: No,  loose and yellow    4. Pain status: Pain free.    5. Return to near baseline physical activity: No    Discharge Planner Nurse   Safe discharge environment identified: No  Barriers to discharge: No       Entered by: Melissa Medley 05/02/2021 12:58 AM     Please review provider order for any additional goals.   Nurse to notify provider when observation goals have been met and patient is ready for discharge.

## 2021-05-02 NOTE — PHARMACY-ADMISSION MEDICATION HISTORY
Pharmacy Medication History  Admission medication history interview status for the 5/1/2021  admission is complete. See EPIC admission navigator for prior to admission medications     Location of Interview: Patient room  Medication history sources: Patient, Surescripts and Care Everywhere    Significant changes made to the medication list:  N/A new list    In the past week, patient estimated taking medication this percent of the time: greater than 90%    Additional medication history information:   Patient had an infected tooth that required extraction and was advised to hold Humira until the issue was resolved. Pt states she could probably take it now but hasn't resumed yet.     Patient had low potassium and was taking 20mEq Potassium for replacement and stopped taking after 5 days because her levels went up.     Medication reconciliation completed by provider prior to medication history? N/A    Time spent in this activity: 10 minutes    Prior to Admission medications    Medication Sig Last Dose Taking? Auth Provider   adalimumab (HUMIRA) 40 MG/0.8ML prefilled syringe kit Inject 40 mg Subcutaneous every 14 days 3/31/2021 Yes Unknown, Entered By History   Cholecalciferol (D3 PO) Take 8,000 Units by mouth daily 4/30/2021 at Unknown time Yes Unknown, Entered By History   Lactobacillus Rhamnosus, GG, (RA PROBIOTIC DIGESTIVE CARE) CAPS Take 1 capsule by mouth daily 4/30/2021 at Unknown time Yes Unknown, Entered By History       The information provided in this note is only as accurate as the sources available at the time of update(s)

## 2021-05-02 NOTE — PROGRESS NOTES
A/OX4. Soft BP, other VSS on RA. CMS intact. K+ 3.4, replaced, recheck scheduled at 1400hrs, however the pt has gone for sigmoidoscopy. Lab will draw K+ when pt is back from the procedure. [facility] following.

## 2021-05-02 NOTE — PROGRESS NOTES
INPATIENT PRE PROCEDURE NOTE     CHIEF COMPLAINT / REASON FOR PROCEDURE: tenesmus, diarrhea, crohn's   History and Physical Reviewed: on chart-<30 days old; updated within 7 days.     PRE-SEDATION ASSESSMENT:   Lung Exam: normal  Heart Exam: normal  Mallampati Airway Classification: Class II (visualization of the soft palate, fauces, and uvula)  Previous reaction to anesthesia/sedation: NO  Sedation plan based on assessment:Moderate (conscious) sedation  Comment(s):    ASA Classification: 2 - Mild systemic disease     IMPRESSION: proceed to flex sig with biopsies      Valentina Belcher MD [facility]  Office: [phone]  After 5 pm  Pager:  [phone]

## 2021-05-02 NOTE — ED NOTES
"[facility]  ED Nurse Handoff Report    ED Chief complaint: Rectal/perineal Pain      ED Diagnosis:   Final diagnoses:   Colitis   Hypokalemia   Crohn's disease of small intestine with other complication (H)   Diarrhea, unspecified type       Code Status: See prior    Allergies:   Allergies   Allergen Reactions     Iodine Anaphylaxis     Other [Seasonal Allergies] Anaphylaxis     IVP dye     Azathioprine Nausea     Ciprofloxacin Unknown     Infliximab      Other reaction(s): Flushing     Morphine      Sulfa Drugs      Vicodin [Hydrocodone-Acetaminophen]        Patient Story: Pt here from gi clinic for rectal pain. Hx of crohn's disease.   Focused Assessment:  Pt Aox3. Diarrhea. Ct shows colon wall thickening. Hypokalemic at 2.6.     Treatments and/or interventions provided: See mar.   Patient's response to treatments and/or interventions: n/a    To be done/followed up on inpatient unit:  n/a    Does this patient have any cognitive concerns?: no    Activity level - Baseline/Home:  Independent  Activity Level - Current:   Independent    Patient's Preferred language: English   Needed?: No    Isolation: None and Enteric  Infection: Not Applicable  C-Diff Pending  Patient tested for COVID 19 prior to admission: YES  Bariatric?: No    Vital Signs:   Vitals:    05/01/21 1630 05/01/21 1838   BP: 124/77 113/68   Pulse: 87 97   Resp: 16 17   Temp: 99.4  F (37.4  C)    TempSrc: Oral    SpO2: 99% 98%   Weight: 61.2 kg (135 lb)    Height: 1.651 m (5' 5\")        Cardiac Rhythm:     Was the PSS-3 completed:   Yes  What interventions are required if any?               Family Comments:  at bedside  OBS brochure/video discussed/provided to patient/family: Yes              Name of person given brochure if not patient: na              Relationship to patient: na    For the majority of the shift this patient's behavior was Green.   Behavioral interventions performed were na.    ED NURSE PHONE NUMBER: " *[phone]

## 2021-05-03 LAB
ANION GAP SERPL CALCULATED.3IONS-SCNC: 4 MMOL/L (ref 3–14)
BUN SERPL-MCNC: 3 MG/DL (ref 7–30)
CALCIUM SERPL-MCNC: 8.2 MG/DL (ref 8.5–10.1)
CHLORIDE SERPL-SCNC: 111 MMOL/L (ref 94–109)
CO2 SERPL-SCNC: 27 MMOL/L (ref 20–32)
CREAT SERPL-MCNC: 0.63 MG/DL (ref 0.52–1.04)
GFR SERPL CREATININE-BSD FRML MDRD: >90 ML/MIN/{1.73_M2}
GLUCOSE BLDC GLUCOMTR-MCNC: 103 MG/DL (ref 70–99)
GLUCOSE SERPL-MCNC: 69 MG/DL (ref 70–99)
POTASSIUM SERPL-SCNC: 3.8 MMOL/L (ref 3.4–5.3)
SODIUM SERPL-SCNC: 142 MMOL/L (ref 133–144)

## 2021-05-03 PROCEDURE — 96375 TX/PRO/DX INJ NEW DRUG ADDON: CPT

## 2021-05-03 PROCEDURE — 80048 BASIC METABOLIC PNL TOTAL CA: CPT | Performed by: INTERNAL MEDICINE

## 2021-05-03 PROCEDURE — G0378 HOSPITAL OBSERVATION PER HR: HCPCS

## 2021-05-03 PROCEDURE — 258N000001 HC RX 258: Performed by: STUDENT IN AN ORGANIZED HEALTH CARE EDUCATION/TRAINING PROGRAM

## 2021-05-03 PROCEDURE — 99232 SBSQ HOSP IP/OBS MODERATE 35: CPT | Performed by: STUDENT IN AN ORGANIZED HEALTH CARE EDUCATION/TRAINING PROGRAM

## 2021-05-03 PROCEDURE — 36415 COLL VENOUS BLD VENIPUNCTURE: CPT | Performed by: INTERNAL MEDICINE

## 2021-05-03 PROCEDURE — 120N000001 HC R&B MED SURG/OB

## 2021-05-03 PROCEDURE — 96361 HYDRATE IV INFUSION ADD-ON: CPT

## 2021-05-03 PROCEDURE — 999N001017 HC STATISTIC GLUCOSE BY METER IP

## 2021-05-03 PROCEDURE — 250N000011 HC RX IP 250 OP 636: Performed by: INTERNAL MEDICINE

## 2021-05-03 RX ORDER — DEXTROSE MONOHYDRATE 25 G/50ML
25 INJECTION, SOLUTION INTRAVENOUS ONCE
Status: COMPLETED | OUTPATIENT
Start: 2021-05-03 | End: 2021-05-03

## 2021-05-03 RX ADMIN — POTASSIUM CHLORIDE AND SODIUM CHLORIDE: 900; 150 INJECTION, SOLUTION INTRAVENOUS at 22:04

## 2021-05-03 RX ADMIN — POTASSIUM CHLORIDE AND SODIUM CHLORIDE: 900; 150 INJECTION, SOLUTION INTRAVENOUS at 12:28

## 2021-05-03 RX ADMIN — DEXTROSE MONOHYDRATE 25 ML: 500 INJECTION PARENTERAL at 08:51

## 2021-05-03 ASSESSMENT — ACTIVITIES OF DAILY LIVING (ADL)
ADLS_ACUITY_SCORE: 14

## 2021-05-03 NOTE — PROGRESS NOTES
Observation goals:        -diagnostic tests and consults completed and resulted: Not met; procedure scheduled for tomorrow      -vital signs normal or at patient baseline :Partially met; soft BP

## 2021-05-03 NOTE — PLAN OF CARE
Pt is AOx4. VSS on RA. Independent. NPO at midnight; colorectal surgery will see pt today. Continent B/B. Potassium came back 3.6. IV; infusing at 100 ml/hr. Denies pain and nausea. Continue to monitor.     Observation goals:        -diagnostic tests and consults completed and resulted: Not met; procedure scheduled for tomorrow      -vital signs normal or at patient baseline :Partially met; soft BP

## 2021-05-03 NOTE — PROGRESS NOTES
SPIRITUAL HEALTH SERVICES Significant Event  [employer] [facility] [employer]  [facility] Observation    Pt anointed by Father Niesha per request of pt's , who contacted spiritual health department to request.  Pt's  declined visit by other spiritual health staff at present.   team is available per need or request.        Jayne Denny  Staff   [phone]

## 2021-05-03 NOTE — PROGRESS NOTES
"GASTROENTEROLOGY PROGRESS NOTE     SUBJECTIVE: Feeling about the same today.      OBJECTIVE:   /61 (BP Location: Right arm)   Pulse 73   Temp 95.9  F (35.5  C) (Oral)   Resp 16   Ht 1.638 m (5' 4.5\")   Wt 61.2 kg (135 lb)   SpO2 97%   BMI 22.81 kg/m     Temp (24hrs), Av.7  F (35.9  C), Min:95.9  F (35.5  C), Max:97.8  F (36.6  C)     Patient Vitals for the past 72 hrs:   Weight   21 1418 61.2 kg (135 lb)   21 1630 61.2 kg (135 lb)      No intake or output data in the 24 hours ending 21 0839   PHYSICAL EXAM   Constitutional: Age appropriate, in bed, no acute distress  Abdomen: Soft, non-tender, non-distended, normally active bowel sounds. No masses or hepatosplenomegaly appreciated. No guarding or rebound tenderness.    Additional Comments:   ROS, FH, SH: See initial GI consult for details.     I have reviewed the patient's new clinical lab results:   Recent Labs   Lab Test 21   WBC 10.1 10.0   HGB 12.6 13.2   MCV 84 82    321      Recent Labs   Lab Test 21  0602 21  2230 21  1502 21  0538 21  19121     --   --  142  --  138   POTASSIUM 3.8 3.6 3.4 3.4   < > 2.6*   CHLORIDE 111*  --   --  113*  --  105   CO2 27  --   --  26  --  29   BUN 3*  --   --  4*  --  7   CR 0.63  --   --  0.59  --  0.65   ANIONGAP 4  --   --  3  --  4   TRACIE 8.2*  --   --  8.1*  --  8.7    < > = values in this interval not displayed.      Recent Labs   Lab Test 21   ALBUMIN 3.4  --    BILITOTAL 1.0  --    ALT 14  --    AST 10  --    ALKPHOS 54  --    PROTEIN  --  Negative     5/1/21 CT abdomen pelvis  IMPRESSION:   1.  Diffuse bowel wall thickening throughout the colon, most prominent  in the sigmoid colon and rectum. Findings suggest a nonspecific  proctocolitis, most likely infectious or inflammatory in etiology.  2.  Nonobstructing 0.3 cm stone in the lower pole of the right kidney.     21 " Flex Sig (Ye)  Findings:        3 large skin tags were found on perianal exam.        Severe anal stenosis. Unable to insert small scope (GIF gastroscope).        Attempted to dilate gently digitally but patient with significant pain.                                                                                     Impression:               - Ileocolonic Crohn's disease now with symptoms of                             inability to empty rectum, rectal pressure, and                             diarrhea/constipation. Anal stenosis on exam that                             could not be dilated digitally without significant                             pain despite anesthesia. Suspect stenosis and                             perianal skin tags are manifestations of her                             Crohn's disease. Suspect symptoms are at least in                             part due to anal stenosis (would explain low                             inflammatory markers). However, given abnormal CT                             scan, needs endoscopic evaluation as well.     Assessment: 55-year-old female with a history of ileocolonic Crohn's disease status post 6 ileal resections currently maintained on Humira 40 mg every 2 weeks who presented with progressive tenesmus and diarrhea.  Inflammatory markers checked as an outpatient have been normal, though her CT on admission did show concern for ongoing inflammation in the colon.  Sigmoidoscopy attempted, though she did have a severe anal stenosis and a small gastroscope could not be passed.  Colorectal surgery was consulted for further evaluation.    Plan:    -NPO  -Awaiting Colon and Rectal Surgery timing for EUA  -Holding on steroids for now until we can assess if there is active inflammation or more chronic fibrostenotic disease  -Serum adalimumab and anti-adalimumab antibodies are pending through our office  -Further recommendations to follow DOMINIC Saldana,  [facility]  Cell:  [phone] Monday through Friday 3066-3093  Office: [phone]

## 2021-05-03 NOTE — UTILIZATION REVIEW
"    Admission Status; Secondary Review Determination         Under the authority of the Utilization Management Committee, the utilization review process indicated a secondary review on the above patient.  The review outcome is based on review of the medical records, discussions with staff, and applying clinical experience noted on the date of the review.        (X)      Inpatient Status Appropriate - This patient's medical care is consistent with medical management for inpatient care and reasonable inpatient medical practice.      () Observation Status Appropriate - This patient does not meet hospital inpatient criteria and is placed in observation status. If this patient's primary payer is Medicare and was admitted as an inpatient, Condition Code 44 should be used and patient status changed to \"observation\".   () Admission Status NOT Appropriate - This patient's medical care is not consistent with medical management for Inpatient or Observation Status.          RATIONALE FOR DETERMINATION     \"Ms. Jacqueline Ruiz is an extremely pleasant 55-year-old female with a past medical history of Crohn's disease, currently on Humira, who presented for evaluation of rectal pain and diarrhea.\"    GI and CRS have been consulted. GI attempted flex sig on 5/2, but patient had significant anal stenosis and hence, CRS will be doing the procedure with anesthesia today. Given need for IVF's due to NPO status, further procedures, the patient is likely to stay > 2 [place] and inpatient status is appropriate.      The severity of illness, intensity of service provided, expected LOS and risk for adverse outcome make the care complex, high risk and appropriate for hospital admission.        The information on this document is developed by the utilization review team in order for the business office to ensure compliance.  This only denotes the appropriateness of proper admission status and does not reflect the quality of care rendered.       "   The definitions of Inpatient Status and Observation Status used in making the determination above are those provided in the CMS Coverage Manual, Chapter 1 and Chapter 6, section 70.4.      Sincerely,     REBEKAH CARBAJAL MD    Physician Advisor  Utilization Review/ Case Management  [facility].

## 2021-05-03 NOTE — PROGRESS NOTES
A/OX4. VSS on RA. Denies pain. BG 69 this am, D50 given, effective. Gretchen full liquid diet well. IVF infusing as ordered. Colorectal surgery following. Plan for NPO at midnight for EUA tomorrow. Pt is transferring at this time to station 88 w/ all pt's belongings.

## 2021-05-03 NOTE — PROGRESS NOTES
[facility]    Hospitalist Progress Note    Date of Service: 05/03/2021    Brief Summary:  Ms. Jacqueline Ruiz is an extremely pleasant 55-year-old female with a past medical history of Crohn's disease, currently on Humira, who presented for evaluation of rectal pain and diarrhea.    Assessment & Plan     Crohn's disease flareup.   Proctocolitis.  Patient with history of Crohn's disease, multiple bowel resection, no come with worsening rectal pain and diarrhea, no blood or mucus, going for few months, get worse in the last 2 weeks, she did missed her dose of Humira because of dental infection. Now daily symptoms. Check ESR and CRP    CT abdomen and pelvis on admission shows diffuse bowel wall thickening throughout the colon, most prominent in the sigmoid and rectum, suggestive of proctocolitis. C diff and enteric bacterial and viral panel negative. I think this is secondary to her Crohn's disease and now worse as she missed the dose of her Humira.     Sigmoidoscopy attempted by [place], though she did have a severe anal stenosis and a small gastroscope could not be passed.  Colorectal surgery was consulted for further evaluation.  Plan:  - per GI -Holding on steroids for now   - Serum adalimumab and anti-adalimumab antibodies are pending through our office  - Full liquids, NPO at midnight  - PRN pain meds  - Plan for EUA tomorrow with Dr. Sanchez     DVT Prophylaxis: Pneumatic Compression Devices  Code Status: Full Code    Disposition: Expected discharge in 1 day.    Ramos Singer MD  Text Page  (7am - 6pm)    Interval History     No abdominal pain, nausea, vomiting, headache or dizziness.     No other significant event overnight.     -Data reviewed today: I reviewed all new labs and imaging results over the last 24 hours. I personally reviewed no images or EKG's today.    Physical Exam   Temp: 96.2  F (35.7  C) Temp src: Oral BP: 108/56 Pulse: 62   Resp: 16 SpO2: 99 % O2 Device: None (Room air)     Vitals:    05/01/21 1630 05/02/21 1418   Weight: 61.2 kg (135 lb) 61.2 kg (135 lb)     Vital Signs with Ranges  Temp:  [95.9  F (35.5  C)-97  F (36.1  C)] 96.2  F (35.7  C)  Pulse:  [62-80] 62  Resp:  [10-29] 16  BP: (107-122)/(56-74) 108/56  SpO2:  [96 %-100 %] 99 %  No intake/output data recorded.    Constitutional: no apparent distress  Respiratory: No increased work of breathing, good air exchange, clear to auscultation bilaterally, no crackles or wheezing  Cardiovascular: Normal apical impulse, regular rate and rhythm, normal S1 and S2, no S3 or S4, and no murmur noted  GI: No scars, normal bowel sounds, soft, non-distended, non-tender,  Musculoskeletal: no lower extremity pitting edema present  Neurologic: no focal deficit.     Medications     0.9% sodium chloride + KCl 20 mEq/L 100 mL/hr at 05/03/21 1228     - MEDICATION INSTRUCTIONS -         fentaNYL  50 mcg Intravenous Once within 24 hrs     midazolam  1-2 mg Intravenous Once within 24 hrs     sodium chloride (PF)  3 mL Intracatheter Q8H       Data   Recent Labs   Lab 05/03/21  0602 05/02/21  2230 05/02/21  1502 05/02/21  0538 05/01/21  1911 05/01/21  1911   WBC  --   --   --   --   --  10.1   HGB  --   --   --   --   --  12.6   MCV  --   --   --   --   --  84   PLT  --   --   --   --   --  410     --   --  142  --  138   POTASSIUM 3.8 3.6 3.4 3.4   < > 2.6*   CHLORIDE 111*  --   --  113*  --  105   CO2 27  --   --  26  --  29   BUN 3*  --   --  4*  --  7   CR 0.63  --   --  0.59  --  0.65   ANIONGAP 4  --   --  3  --  4   TRACIE 8.2*  --   --  8.1*  --  8.7   GLC 69*  --   --  91  --  91   ALBUMIN  --   --   --   --   --  3.4   PROTTOTAL  --   --   --   --   --  7.1   BILITOTAL  --   --   --   --   --  1.0   ALKPHOS  --   --   --   --   --  54   ALT  --   --   --   --   --  14   AST  --   --   --   --   --  10    < > = values in this interval not displayed.       No results found for this or any previous visit (from the past 24 hour(s)).

## 2021-05-03 NOTE — PROGRESS NOTES
COLON & RECTAL SURGERY  PROGRESS NOTE    May 3, 2021    SUBJECTIVE:  Pain improved. Passing flatus. Tolerated clear liquids yesterday. NPO for possible procedure today.     OBJECTIVE:  Temp:  [95.9  F (35.5  C)-97  F (36.1  C)] 96.2  F (35.7  C)  Pulse:  [62-80] 62  Resp:  [10-29] 16  BP: (107-127)/(56-80) 108/56  SpO2:  [96 %-100 %] 99 %  No intake or output data in the 24 hours ending 05/03/21 1233    GENERAL:  Awake, alert, no acute distress  HEAD: Normocephalic atraumatic  SCLERA: Anicteric  EXTREMITIES: Warm and well perfused  ABDOMEN:  Soft, appropriately tender, non-distended. No guarding, rigidity, or peritoneal signs.     LABS:  Lab Results   Component Value Date    WBC 10.1 05/01/2021     Lab Results   Component Value Date    HGB 12.6 05/01/2021     Lab Results   Component Value Date    HCT 39.2 05/01/2021     Lab Results   Component Value Date     05/01/2021     Last Basic Metabolic Panel:  Lab Results   Component Value Date     05/03/2021      Lab Results   Component Value Date    POTASSIUM 3.8 05/03/2021     Lab Results   Component Value Date    CHLORIDE 111 05/03/2021     Lab Results   Component Value Date    TRACIE 8.2 05/03/2021     Lab Results   Component Value Date    CO2 27 05/03/2021     Lab Results   Component Value Date    BUN 3 05/03/2021     Lab Results   Component Value Date    CR 0.63 05/03/2021     Lab Results   Component Value Date    GLC 69 05/03/2021       ASSESSMENT/PLAN: 54 yo F with Crohn's disease now with anal stricture. AVSS. Okay for liquid diet today. Will plan for EUA with dilation and flex sig tomorrow in the OR under MAC with Dr. Sanchez. Will update patient on timing once scheduled.     1. Full liquids, NPO at midnight  2. PRN pain meds  3. Plan for EUA tomorrow with Dr. Sanchez    Discussed with Dr. Sanchez.     For questions/paging, please contact the CRS office at [phone].    Barbara Whitley (Hermes), OMID  Colorectal Physician Assistant    [facility]  [address] [place] [address]  [place] [address]  T: [phone]  F: [phone]

## 2021-05-03 NOTE — CONSULTS
[facility]  Colon and Rectal Surgery Consult Note  Name: Jacqueline Ruiz    MRN: [id number]  YOB: 1965    Age: 55 year old  Date of admission: 5/1/2021  Primary care provider: Vance Good       Reason for consult:  Anal stenosis           History of Present Illness:     Jacqueline Ruiz is a 55 year old female, who presents with rectal pain and tenesmus x 6 months.    She was diagnosed with Crohn's disease at age 19, and is currently on Humira. She has had at least 5-6 prior ileocolic resection. Overall her disease has been well-managed--she is eating OK, maintaining her weight, and denies abdominal pain. However, she has had 6-7 months of rectal pain, pressure, urgency, and incomplete defecation. Her stools are soft or liquid usually, and she would need several attempts before fully emptying her bowels. She denies any bleeding.     She presented to ED yesterday, and CT scan shows mild proctocolitis. No obstruction. Her labs were largely unremarkable (WBC 10.1 and CP < 2.9). She was brought down for a flex sig today, but she was found to have severe anal stenosis and a small gastroscope could not even be passed.    Of note, she saw our partner Dr. Yost in clinic on 10/10/19. Per that clinic note, Dr. Yost already suspected anal stenosis, but the pt deferred an anal exam at that time and her symptoms improved so she did not follow up.                 Past Medical History:   Crohn's disease          Past Surgical History:   Ileocolic resection x 5  Appendectomy  Cholecystectomy            Social History:     Social History     Tobacco Use     Smoking status: Never Smoker     Smokeless tobacco: Never Used   Substance Use Topics     Alcohol use: No             Family History:   History reviewed. No pertinent family history.          Allergies:     Allergies   Allergen Reactions     Iodine Anaphylaxis     Other [Seasonal Allergies] Anaphylaxis     IVP dye     Azathioprine Nausea      "Ciprofloxacin Unknown     Infliximab      Other reaction(s): Flushing     Morphine      Sulfa Drugs      Vicodin [Hydrocodone-Acetaminophen]              Medications:       fentaNYL  50 mcg Intravenous Once within 24 hrs     midazolam  1-2 mg Intravenous Once within 24 hrs     sodium chloride (PF)  3 mL Intracatheter Q8H             Review of Systems:   A comprehensive greater than 10 system review of systems was carried out.  Pertinent positives and negatives are noted above.  Otherwise negative for contributory info.            Physical Exam:     Blood pressure 116/74, pulse 67, temperature 97  F (36.1  C), temperature source Oral, resp. rate 16, height 1.638 m (5' 4.5\"), weight 61.2 kg (135 lb), SpO2 97 %.    No acute distress  Appears comfortable, lying in bed   at bedside  Abdomen is very soft. Prior incisions are well-healed  Perianal exam: three large skin-tags vs hemorrhoids in usual quadrants. No thrombosis. I was not able to insert by finger into anus. I did feel scar tissue at the center of the confluence of these tags, and a possible lumen. I could not find any other lumens around these obscuring skin tags. No evidence of fistulas or abscesses             Data Reviewed:     Labs and imaging reviewed      Assessment and Plan:     Jacqueline Ruiz is a pleasant 55-year-old lady with Crohn's disease since age 19, currently on Humira, and multiple prior ileocolic resection, who presents with 6-7 months of rectal pain/urgency/tenesmus and diarrhea. CT scan shows proctocolitis. Flex sig today by GI was aborted due to the findings of severe anal stenosis that was not able to accommodate a scope. On my exam, I corroborated the stenosis, in addition to several large skin tags. No evidence of perianal infection/sepsis/fistulas.    Her inflammatory markers are mostly normal, but it is possible that she has had chronic inflammation of her anal canal and that it has gradually scarred down. I did discuss an EUA " with dilation with the patient. We will do this under general anesthesia or MAC, and I discussed that she may need repeated dilations.     In meantime:    - NPO after midnight  - IVF  - We will discuss timing of dilation   - May need a flex sig either before or during dilation, to assess degree of inflammation. She may need stronger medical suppression of her anal Crohn's        Fabricio Yanez MD  Colorectal Fellow  5/2/2021

## 2021-05-04 ENCOUNTER — ANESTHESIA EVENT (OUTPATIENT)
Dept: SURGERY | Facility: CLINIC | Age: 56
DRG: 394 | End: 2021-05-04
Payer: COMMERCIAL

## 2021-05-04 PROCEDURE — 250N000011 HC RX IP 250 OP 636: Performed by: INTERNAL MEDICINE

## 2021-05-04 PROCEDURE — 120N000001 HC R&B MED SURG/OB

## 2021-05-04 PROCEDURE — 99232 SBSQ HOSP IP/OBS MODERATE 35: CPT | Performed by: STUDENT IN AN ORGANIZED HEALTH CARE EDUCATION/TRAINING PROGRAM

## 2021-05-04 RX ADMIN — POTASSIUM CHLORIDE AND SODIUM CHLORIDE: 900; 150 INJECTION, SOLUTION INTRAVENOUS at 18:23

## 2021-05-04 RX ADMIN — POTASSIUM CHLORIDE AND SODIUM CHLORIDE: 900; 150 INJECTION, SOLUTION INTRAVENOUS at 08:00

## 2021-05-04 ASSESSMENT — ACTIVITIES OF DAILY LIVING (ADL)
ADLS_ACUITY_SCORE: 14

## 2021-05-04 NOTE — PLAN OF CARE
AOX4. VSS on RA. Up ind in room. Tolerating full liquid diet. Denies pain and N/V. Continent, pt reporting loose watery yellow stools. PIV infusing IV @ 100 ml/hr. GI and CRS following. NPO @ midnight for EUA tomorrow.

## 2021-05-04 NOTE — PROGRESS NOTES
"GASTROENTEROLOGY PROGRESS NOTE    CC: Ileocolonic crohn's disease with anal stenosis    SUBJECTIVE:  Patient still complains of moderate rectal pain worse with a BM  Limiting diet because afraid to eat  OBJECTIVE:  General Appearance:  Well nourished in NAD  /62 (BP Location: Left arm)   Pulse 62   Temp 97.7  F (36.5  C) (Oral)   Resp 18   Ht 1.638 m (5' 4.5\")   Wt 61.2 kg (135 lb)   SpO2 98%   BMI 22.81 kg/m    Temp (24hrs), Av.3  F (36.3  C), Min:95.9  F (35.5  C), Max:98.7  F (37.1  C)    Patient Vitals for the past 72 hrs:   Weight   21 1418 61.2 kg (135 lb)   21 1630 61.2 kg (135 lb)       Intake/Output Summary (Last 24 hours) at 2021 1101  Last data filed at 2021 1000  Gross per 24 hour   Intake 200 ml   Output --   Net 200 ml       PHYSICAL EXAM  Constitutional: healthy, alert and mild distress       Additional Comments:  ROS, FH, SH: See initial GI consult for details.    I have reviewed the patient's new clinical lab results:    Recent Labs   Lab Test 21   WBC 10.1 10.0   HGB 12.6 13.2   MCV 84 82    321     Recent Labs   Lab Test 21  0602 21  2230 21  1502 21  0538 21   POTASSIUM 3.8 3.6 3.4 3.4   < > 2.6*   CHLORIDE 111*  --   --  113*  --  105   CO2 27  --   --  26  --  29   BUN 3*  --   --  4*  --  7   ANIONGAP 4  --   --  3  --  4    < > = values in this interval not displayed.     Recent Labs   Lab Test 21   ALBUMIN 3.4  --    BILITOTAL 1.0  --    ALT 14  --    AST 10  --    PROTEIN  --  Negative     21 CT abdomen pelvis  IMPRESSION:   1.  Diffuse bowel wall thickening throughout the colon, most prominent  in the sigmoid colon and rectum. Findings suggest a nonspecific  proctocolitis, most likely infectious or inflammatory in etiology.  2.  Nonobstructing 0.3 cm stone in the lower pole of the right kidney.      21 Flex Sig (Ye)  Findings: "        3 large skin tags were found on perianal exam.        Severe anal stenosis. Unable to insert small scope (GIF gastroscope).        Attempted to dilate gently digitally but patient with significant pain.                                                                                     Impression:               - Ileocolonic Crohn's disease now with symptoms of                             inability to empty rectum, rectal pressure, and                             diarrhea/constipation. Anal stenosis on exam that                             could not be dilated digitally without significant                             pain despite anesthesia. Suspect stenosis and                             perianal skin tags are manifestations of her                             Crohn's disease. Suspect symptoms are at least in                             part due to anal stenosis (would explain low                             inflammatory markers). However, given abnormal CT                             scan, needs endoscopic evaluation as well.      ASSESSMENT /PLAN    Ileocolonic crohn's disease with anal stenosis  Inflammation vs. Active crohn's inflammation    Surgery bumped until tomorrow  Urged increase dietary intake     Will continue to follow      Angie Brian MD  [facility]  Office

## 2021-05-04 NOTE — PLAN OF CARE
"A&Ox4. VSS on RA. Up Independently. NPO since midnight. Denies pain. LS clear. BS active. Denies N/V. C/o right hand numbness intermittently as like her \"hand falls asleep feeling.\" Voiding bathroom, loose/watery yellow stool. IVF infusing @100mL/hr. Discharge pending. Plan for EUA with dilation and flex sig. GI and CRS following. Continue to monitor.   "

## 2021-05-04 NOTE — PROGRESS NOTES
Colorectal Surgery Progress Note    HD 3 for anal stenosis    Subjective:     - No acute events overnight  - Tolerated full liquid diet  - No nausea/vomiting  - Had multiple liquid BMs  - Rectal pain much better      Physical Exam:    Afebrile, VSS  No acute distress, lying in bed  Grossly alert and oriented  Breathing comfortably on room air  Abdomen is very soft, non-distended. Non-tender. Incisions well-healed  Moving extremities spontaneously. Warm and well-perfused  Rectal exam deferred    ASSESSMENT/PLAN:     Jacqueline Ruiz is a 55-year-old lady with Crohn's disease since age 19, currently on Humira, and multiple prior ileocolic resection, who presents with 6-7 months of rectal pain/urgency/tenesmus and diarrhea. CT scan shows proctocolitis. Flex sig on 5/2/21 by GI was aborted due to the findings of severe anal stenosis that was not able to accommodate a scope. On my exam, I corroborated the stenosis, in addition to several large skin tags. No evidence of perianal infection/sepsis/fistulas.    Tolerating full liquids, with soft abdomen, and no nausea/vomiting. Thus, no large bowel obstruction. However, will proceed to OR for EUA today plus/minus flex sig, to dilate and assess degree of anal inflammation.    - NPO  - OR today, timing to be determined  - Will be able to resume diet after  - Steroids/biologics will be per GI, after findings of scope        Fabricio Yanez MD ..................5/4/2021   7:38 AM  Fellow  Colon and Rectal Surgery

## 2021-05-04 NOTE — PLAN OF CARE
Pt A/O x 4. No complaints of pain. Tolerating full liquid diet with no pain. 3 loose yellow stools this shift. Plan for NPO @ midnight and flex sig under anesthesia tomorrow morning.

## 2021-05-04 NOTE — PROGRESS NOTES
[facility]    Hospitalist Progress Note    Date of Service: 05/04/2021    Brief Summary:  Ms. Jacqueline Ruiz is an extremely pleasant 55-year-old female with a past medical history of Crohn's disease, currently on Humira, who presented for evaluation of rectal pain and diarrhea.    Assessment & Plan     Crohn's disease flareup.   Proctocolitis.  Patient with history of Crohn's disease, multiple bowel resection, no come with worsening rectal pain and diarrhea, no blood or mucus, going for few months, get worse in the last 2 weeks, she did missed her dose of Humira because of dental infection. Now daily symptoms. Check ESR and CRP    CT abdomen and pelvis on admission shows diffuse bowel wall thickening throughout the colon, most prominent in the sigmoid and rectum, suggestive of proctocolitis. C diff and enteric bacterial and viral panel negative. I think this is secondary to her Crohn's disease and now worse as she missed the dose of her Humira.     Sigmoidoscopy attempted by [facility], though she did have a severe anal stenosis and a small gastroscope could not be passed.  Colorectal surgery was consulted for further evaluation.  Plan:  - per GI -Holding on steroids for now   - Serum adalimumab and anti-adalimumab antibodies are pending through our office  - Full liquids, NPO at midnight  - PRN pain meds  - Plan for EUA tomorrow     DVT Prophylaxis: Pneumatic Compression Devices  Code Status: Full Code    Disposition: Expected discharge in 1-2 days pending Colorectal and GI work up    Ramos Singer MD  Text Page  (7am - 6pm)    Interval History     EUA pushed back to tomorrow.   No abdominal pain, nausea, vomiting, headache or dizziness.   No other significant event overnight.     -Data reviewed today: I reviewed all new labs and imaging results over the last 24 hours. I personally reviewed no images or EKG's today.    Physical Exam   Temp: 98.6  F (37  C) Temp src: Oral BP: 103/51 Pulse: 70    Resp: 18 SpO2: 97 % O2 Device: None (Room air)    Vitals:    05/01/21 1630 05/02/21 1418   Weight: 61.2 kg (135 lb) 61.2 kg (135 lb)     Vital Signs with Ranges  Temp:  [96.8  F (36  C)-98.7  F (37.1  C)] 98.6  F (37  C)  Pulse:  [62-77] 70  Resp:  [17-18] 18  BP: (103-122)/(51-71) 103/51  SpO2:  [96 %-98 %] 97 %  I/O last 3 completed shifts:  In: 600 [I.V.:600]  Out: -     Constitutional: no apparent distress  Respiratory: No increased work of breathing, good air exchange, clear to auscultation bilaterally, no crackles or wheezing  Cardiovascular: Normal apical impulse, regular rate and rhythm, normal S1 and S2, no S3 or S4, and no murmur noted  GI: No scars, normal bowel sounds, soft, non-distended, non-tender,  Musculoskeletal: no lower extremity pitting edema present  Neurologic: no focal deficit.     Medications     0.9% sodium chloride + KCl 20 mEq/L 100 mL/hr at 05/04/21 0800     - MEDICATION INSTRUCTIONS -         sodium chloride (PF)  3 mL Intracatheter Q8H       Data   Recent Labs   Lab 05/03/21  0602 05/02/21  2230 05/02/21  1502 05/02/21  0538 05/01/21  1911 05/01/21  1911   WBC  --   --   --   --   --  10.1   HGB  --   --   --   --   --  12.6   MCV  --   --   --   --   --  84   PLT  --   --   --   --   --  410     --   --  142  --  138   POTASSIUM 3.8 3.6 3.4 3.4   < > 2.6*   CHLORIDE 111*  --   --  113*  --  105   CO2 27  --   --  26  --  29   BUN 3*  --   --  4*  --  7   CR 0.63  --   --  0.59  --  0.65   ANIONGAP 4  --   --  3  --  4   TRACIE 8.2*  --   --  8.1*  --  8.7   GLC 69*  --   --  91  --  91   ALBUMIN  --   --   --   --   --  3.4   PROTTOTAL  --   --   --   --   --  7.1   BILITOTAL  --   --   --   --   --  1.0   ALKPHOS  --   --   --   --   --  54   ALT  --   --   --   --   --  14   AST  --   --   --   --   --  10    < > = values in this interval not displayed.       No results found for this or any previous visit (from the past 24 hour(s)).

## 2021-05-05 ENCOUNTER — ANESTHESIA (OUTPATIENT)
Dept: SURGERY | Facility: CLINIC | Age: 56
DRG: 394 | End: 2021-05-05
Payer: COMMERCIAL

## 2021-05-05 LAB — GLUCOSE BLDC GLUCOMTR-MCNC: 93 MG/DL (ref 70–99)

## 2021-05-05 PROCEDURE — 710N000009 HC RECOVERY PHASE 1, LEVEL 1, PER MIN: Performed by: COLON & RECTAL SURGERY

## 2021-05-05 PROCEDURE — 370N000017 HC ANESTHESIA TECHNICAL FEE, PER MIN: Performed by: COLON & RECTAL SURGERY

## 2021-05-05 PROCEDURE — 250N000011 HC RX IP 250 OP 636: Performed by: INTERNAL MEDICINE

## 2021-05-05 PROCEDURE — 0DBN8ZX EXCISION OF SIGMOID COLON, VIA NATURAL OR ARTIFICIAL OPENING ENDOSCOPIC, DIAGNOSTIC: ICD-10-PCS | Performed by: COLON & RECTAL SURGERY

## 2021-05-05 PROCEDURE — 258N000003 HC RX IP 258 OP 636: Performed by: NURSE ANESTHETIST, CERTIFIED REGISTERED

## 2021-05-05 PROCEDURE — 88305 TISSUE EXAM BY PATHOLOGIST: CPT | Mod: 26 | Performed by: PATHOLOGY

## 2021-05-05 PROCEDURE — 250N000013 HC RX MED GY IP 250 OP 250 PS 637: Performed by: COLON & RECTAL SURGERY

## 2021-05-05 PROCEDURE — 120N000001 HC R&B MED SURG/OB

## 2021-05-05 PROCEDURE — 88305 TISSUE EXAM BY PATHOLOGIST: CPT | Mod: TC | Performed by: COLON & RECTAL SURGERY

## 2021-05-05 PROCEDURE — 250N000011 HC RX IP 250 OP 636: Performed by: COLON & RECTAL SURGERY

## 2021-05-05 PROCEDURE — 360N000075 HC SURGERY LEVEL 2, PER MIN: Performed by: COLON & RECTAL SURGERY

## 2021-05-05 PROCEDURE — 250N000011 HC RX IP 250 OP 636: Performed by: NURSE ANESTHETIST, CERTIFIED REGISTERED

## 2021-05-05 PROCEDURE — 272N000001 HC OR GENERAL SUPPLY STERILE: Performed by: COLON & RECTAL SURGERY

## 2021-05-05 PROCEDURE — 99232 SBSQ HOSP IP/OBS MODERATE 35: CPT | Performed by: STUDENT IN AN ORGANIZED HEALTH CARE EDUCATION/TRAINING PROGRAM

## 2021-05-05 PROCEDURE — 999N000141 HC STATISTIC PRE-PROCEDURE NURSING ASSESSMENT: Performed by: COLON & RECTAL SURGERY

## 2021-05-05 PROCEDURE — 258N000003 HC RX IP 258 OP 636: Performed by: ANESTHESIOLOGY

## 2021-05-05 PROCEDURE — 999N001017 HC STATISTIC GLUCOSE BY METER IP

## 2021-05-05 PROCEDURE — 250N000009 HC RX 250: Performed by: NURSE ANESTHETIST, CERTIFIED REGISTERED

## 2021-05-05 PROCEDURE — 0DBP8ZX EXCISION OF RECTUM, VIA NATURAL OR ARTIFICIAL OPENING ENDOSCOPIC, DIAGNOSTIC: ICD-10-PCS | Performed by: COLON & RECTAL SURGERY

## 2021-05-05 PROCEDURE — 250N000009 HC RX 250: Performed by: COLON & RECTAL SURGERY

## 2021-05-05 PROCEDURE — 0D7Q8ZZ DILATION OF ANUS, VIA NATURAL OR ARTIFICIAL OPENING ENDOSCOPIC: ICD-10-PCS | Performed by: COLON & RECTAL SURGERY

## 2021-05-05 RX ORDER — SODIUM CHLORIDE, SODIUM LACTATE, POTASSIUM CHLORIDE, CALCIUM CHLORIDE 600; 310; 30; 20 MG/100ML; MG/100ML; MG/100ML; MG/100ML
INJECTION, SOLUTION INTRAVENOUS CONTINUOUS
Status: DISCONTINUED | OUTPATIENT
Start: 2021-05-05 | End: 2021-05-05 | Stop reason: HOSPADM

## 2021-05-05 RX ORDER — SODIUM CHLORIDE AND POTASSIUM CHLORIDE 150; 900 MG/100ML; MG/100ML
INJECTION, SOLUTION INTRAVENOUS CONTINUOUS
Status: DISCONTINUED | OUTPATIENT
Start: 2021-05-05 | End: 2021-05-06

## 2021-05-05 RX ORDER — DEXAMETHASONE SODIUM PHOSPHATE 4 MG/ML
INJECTION, SOLUTION INTRA-ARTICULAR; INTRALESIONAL; INTRAMUSCULAR; INTRAVENOUS; SOFT TISSUE PRN
Status: DISCONTINUED | OUTPATIENT
Start: 2021-05-05 | End: 2021-05-05

## 2021-05-05 RX ORDER — BUPIVACAINE HYDROCHLORIDE 5 MG/ML
INJECTION, SOLUTION EPIDURAL; INTRACAUDAL
Status: DISCONTINUED
Start: 2021-05-05 | End: 2021-05-05 | Stop reason: HOSPADM

## 2021-05-05 RX ORDER — HYDRALAZINE HYDROCHLORIDE 20 MG/ML
2.5-5 INJECTION INTRAMUSCULAR; INTRAVENOUS EVERY 10 MIN PRN
Status: DISCONTINUED | OUTPATIENT
Start: 2021-05-05 | End: 2021-05-05 | Stop reason: HOSPADM

## 2021-05-05 RX ORDER — LABETALOL HYDROCHLORIDE 5 MG/ML
10 INJECTION, SOLUTION INTRAVENOUS
Status: DISCONTINUED | OUTPATIENT
Start: 2021-05-05 | End: 2021-05-05 | Stop reason: HOSPADM

## 2021-05-05 RX ORDER — SODIUM CHLORIDE, SODIUM LACTATE, POTASSIUM CHLORIDE, CALCIUM CHLORIDE 600; 310; 30; 20 MG/100ML; MG/100ML; MG/100ML; MG/100ML
INJECTION, SOLUTION INTRAVENOUS CONTINUOUS PRN
Status: DISCONTINUED | OUTPATIENT
Start: 2021-05-05 | End: 2021-05-05

## 2021-05-05 RX ORDER — ONDANSETRON 4 MG/1
4 TABLET, ORALLY DISINTEGRATING ORAL EVERY 30 MIN PRN
Status: DISCONTINUED | OUTPATIENT
Start: 2021-05-05 | End: 2021-05-05 | Stop reason: HOSPADM

## 2021-05-05 RX ORDER — ALBUTEROL SULFATE 0.83 MG/ML
2.5 SOLUTION RESPIRATORY (INHALATION) EVERY 4 HOURS PRN
Status: DISCONTINUED | OUTPATIENT
Start: 2021-05-05 | End: 2021-05-05 | Stop reason: HOSPADM

## 2021-05-05 RX ORDER — PROPOFOL 10 MG/ML
INJECTION, EMULSION INTRAVENOUS CONTINUOUS PRN
Status: DISCONTINUED | OUTPATIENT
Start: 2021-05-05 | End: 2021-05-05

## 2021-05-05 RX ORDER — ONDANSETRON 2 MG/ML
4 INJECTION INTRAMUSCULAR; INTRAVENOUS EVERY 30 MIN PRN
Status: DISCONTINUED | OUTPATIENT
Start: 2021-05-05 | End: 2021-05-05 | Stop reason: HOSPADM

## 2021-05-05 RX ORDER — MAGNESIUM HYDROXIDE 1200 MG/15ML
LIQUID ORAL PRN
Status: DISCONTINUED | OUTPATIENT
Start: 2021-05-05 | End: 2021-05-05 | Stop reason: HOSPADM

## 2021-05-05 RX ORDER — HYDROMORPHONE HYDROCHLORIDE 1 MG/ML
.3-.5 INJECTION, SOLUTION INTRAMUSCULAR; INTRAVENOUS; SUBCUTANEOUS EVERY 5 MIN PRN
Status: DISCONTINUED | OUTPATIENT
Start: 2021-05-05 | End: 2021-05-05 | Stop reason: HOSPADM

## 2021-05-05 RX ORDER — FENTANYL CITRATE 50 UG/ML
INJECTION, SOLUTION INTRAMUSCULAR; INTRAVENOUS PRN
Status: DISCONTINUED | OUTPATIENT
Start: 2021-05-05 | End: 2021-05-05

## 2021-05-05 RX ORDER — FENTANYL CITRATE 50 UG/ML
25-50 INJECTION, SOLUTION INTRAMUSCULAR; INTRAVENOUS
Status: DISCONTINUED | OUTPATIENT
Start: 2021-05-05 | End: 2021-05-05 | Stop reason: HOSPADM

## 2021-05-05 RX ORDER — BUPIVACAINE HYDROCHLORIDE 5 MG/ML
INJECTION, SOLUTION PERINEURAL PRN
Status: DISCONTINUED | OUTPATIENT
Start: 2021-05-05 | End: 2021-05-05 | Stop reason: HOSPADM

## 2021-05-05 RX ORDER — MEPERIDINE HYDROCHLORIDE 25 MG/ML
12.5 INJECTION INTRAMUSCULAR; INTRAVENOUS; SUBCUTANEOUS EVERY 5 MIN PRN
Status: DISCONTINUED | OUTPATIENT
Start: 2021-05-05 | End: 2021-05-05 | Stop reason: HOSPADM

## 2021-05-05 RX ADMIN — PROPOFOL 75 MCG/KG/MIN: 10 INJECTION, EMULSION INTRAVENOUS at 07:32

## 2021-05-05 RX ADMIN — ACETAMINOPHEN 650 MG: 325 TABLET, FILM COATED ORAL at 12:45

## 2021-05-05 RX ADMIN — MIDAZOLAM 2 MG: 1 INJECTION INTRAMUSCULAR; INTRAVENOUS at 07:28

## 2021-05-05 RX ADMIN — POTASSIUM CHLORIDE AND SODIUM CHLORIDE: 900; 150 INJECTION, SOLUTION INTRAVENOUS at 10:12

## 2021-05-05 RX ADMIN — FENTANYL CITRATE 50 MCG: 50 INJECTION, SOLUTION INTRAMUSCULAR; INTRAVENOUS at 08:13

## 2021-05-05 RX ADMIN — ONDANSETRON 4 MG: 2 INJECTION INTRAMUSCULAR; INTRAVENOUS at 07:46

## 2021-05-05 RX ADMIN — DEXAMETHASONE SODIUM PHOSPHATE 4 MG: 4 INJECTION, SOLUTION INTRA-ARTICULAR; INTRALESIONAL; INTRAMUSCULAR; INTRAVENOUS; SOFT TISSUE at 07:46

## 2021-05-05 RX ADMIN — SODIUM CHLORIDE, POTASSIUM CHLORIDE, SODIUM LACTATE AND CALCIUM CHLORIDE: 600; 310; 30; 20 INJECTION, SOLUTION INTRAVENOUS at 07:26

## 2021-05-05 RX ADMIN — DEXMEDETOMIDINE HYDROCHLORIDE 12 MCG: 100 INJECTION, SOLUTION INTRAVENOUS at 07:34

## 2021-05-05 RX ADMIN — POTASSIUM CHLORIDE AND SODIUM CHLORIDE: 900; 150 INJECTION, SOLUTION INTRAVENOUS at 04:17

## 2021-05-05 RX ADMIN — FENTANYL CITRATE 50 MCG: 50 INJECTION, SOLUTION INTRAMUSCULAR; INTRAVENOUS at 07:33

## 2021-05-05 RX ADMIN — POTASSIUM CHLORIDE AND SODIUM CHLORIDE: 900; 150 INJECTION, SOLUTION INTRAVENOUS at 19:51

## 2021-05-05 RX ADMIN — SODIUM CHLORIDE, POTASSIUM CHLORIDE, SODIUM LACTATE AND CALCIUM CHLORIDE: 600; 310; 30; 20 INJECTION, SOLUTION INTRAVENOUS at 06:38

## 2021-05-05 ASSESSMENT — ACTIVITIES OF DAILY LIVING (ADL)
ADLS_ACUITY_SCORE: 14

## 2021-05-05 ASSESSMENT — LIFESTYLE VARIABLES: TOBACCO_USE: 0

## 2021-05-05 ASSESSMENT — MIFFLIN-ST. JEOR: SCORE: 1198.96

## 2021-05-05 NOTE — ANESTHESIA POSTPROCEDURE EVALUATION
Patient: Jacqueline Ruiz    Procedure(s):  EXAM UNDER ANESTHESIA, DILATION OF ANAL STRICTURE  INTRAOPERATIVE FLEXIBLE SIGMOIDOSCOPY  SIGMOID AND RECTAL BIOPSIES    Diagnosis:Anal stenosis [K62.4]  Diagnosis Additional Information: No value filed.    Anesthesia Type:  MAC    Note:  Disposition: Inpatient   Postop Pain Control: Uneventful            Sign Out: Well controlled pain   PONV: No   Neuro/Psych: Uneventful            Sign Out: Acceptable/Baseline neuro status   Airway/Respiratory: Uneventful            Sign Out: Acceptable/Baseline resp. status   CV/Hemodynamics: Uneventful            Sign Out: Acceptable CV status; No obvious hypovolemia; No obvious fluid overload   Other NRE: NONE   DID A NON-ROUTINE EVENT OCCUR? No           Last vitals:  Vitals:    05/05/21 0823 05/05/21 0826 05/05/21 0830   BP: (!) 83/58 97/63 102/59   Pulse: 64 82 77   Resp: 18 18 16   Temp:   36.6  C (97.8  F)   SpO2: 100% 97% 97%       Last vitals prior to Anesthesia Care Transfer:  CRNA VITALS  5/5/2021 0752 - 5/5/2021 0852      5/5/2021             NIBP:  90/58    Pulse:  82    SpO2:  100 %    Resp Rate (observed):  8    Resp Rate (set):  10          Electronically Signed By: David Cook MD  May 5, 2021  9:02 AM

## 2021-05-05 NOTE — PLAN OF CARE
[id number]:  Anal stenosis in the setting of Ileocolonic crohn's disease, Rectal pain.  A&Ox4.  Had Flexible Sigmoidoscopy with biopsies today.  Some spotting on pad upon arrival from PACU.  VSS, on RA.  Afebrile.  On regular diet.  Had cream of wheat, some yogurt for late breakfast.  Scared to eat more solid foods.  Had Turkey with pancakes (tolerated well).  PIV infusing NS+20KCl @100ml/hr.  Up independently.  Received prn PO Tylenol for c/o abdominal pain/discomfort.  Also reported having less pain after BM.  BM loose greenish, brown, yellow with some blood noted per pt report.  Seen by GI after surgery.

## 2021-05-05 NOTE — OR NURSING
MDA Cook now rounding on Pt, Pt awake and interactive w/MDA.  VSS. Scant spotting to peripad unchanged.  Pt CASTLE, denies pain.  Okay for Pt to transfer out of PACU.

## 2021-05-05 NOTE — PROGRESS NOTES
COLON & RECTAL SURGERY  PROGRESS NOTE      SUBJECTIVE:  OR this morning for dilation.     OBJECTIVE:  Temp:  [97  F (36.1  C)-98.6  F (37  C)] 97  F (36.1  C)  Pulse:  [58-70] 58  Resp:  [18] 18  BP: (103-110)/(51-63) 106/63  SpO2:  [97 %-98 %] 98 %    Intake/Output Summary (Last 24 hours) at 5/5/2021 0735  Last data filed at 5/4/2021 1400  Gross per 24 hour   Intake 600 ml   Output --   Net 600 ml       GENERAL:  Awake, alert, no acute distresno  EXTREMITIES: Warm and well perfused, no edema   ABDOMEN:  Soft, non tender, non-distended.   LABS:  Lab Results   Component Value Date    WBC 10.1 05/01/2021     Lab Results   Component Value Date    HGB 12.6 05/01/2021     Lab Results   Component Value Date    HCT 39.2 05/01/2021     Lab Results   Component Value Date     05/01/2021     Last Basic Metabolic Panel:  Lab Results   Component Value Date     05/03/2021      Lab Results   Component Value Date    POTASSIUM 3.8 05/03/2021     Lab Results   Component Value Date    CHLORIDE 111 05/03/2021     Lab Results   Component Value Date    TRACIE 8.2 05/03/2021     Lab Results   Component Value Date    CO2 27 05/03/2021     Lab Results   Component Value Date    BUN 3 05/03/2021     Lab Results   Component Value Date    CR 0.63 05/03/2021     Lab Results   Component Value Date    GLC 69 05/03/2021       ASSESSMENT/PLAN:Jacqueline Ruiz is a 55-year-old lady with Crohn's disease since age 19, currently on Humira, and multiple prior ileocolic resection, who presents with 6-7 months of rectal pain/urgency/tenesmus and diarrhea. CT scan shows proctocolitis. Flex sig on 5/2/21 by GI was aborted due to the findings of severe anal stenosis that was not able to accommodate a scope.     Plan for OR today for EUA, dilation and flex sig with biopsies.  Ok for diet post-op.   Will plan for discharge later today or tomorrow pending GI recommendations and relief of symptoms.   Appreciate GI assistance with care. Will discuss plan  for biologics or steroids pending endoscopy findings.     For questions/paging, please contact the CRS office at [phone].    Ashlyn Sanchez MD  Colorectal Surgery    [facility]  [address]  [place] [address]  T: [phone]  F: [phone]

## 2021-05-05 NOTE — PLAN OF CARE
A&Ox4. VSS on RA. NPO since midnight, BG 93. Up independent. Denies pain, N/V. BMx1, loose and green. PIV infusing NS+20KCl @100ml/h. Plan for flex sig with biopsies today.

## 2021-05-05 NOTE — PROGRESS NOTES
"GASTROENTEROLOGY PROGRESS NOTE    CC: anal stenosis    SUBJECTIVE:  Less pain with BM   Afraid to eat regular food    OBJECTIVE:  General Appearance:  Well nourished in NAD  /66 (BP Location: Left arm)   Pulse 66   Temp 96.3  F (35.7  C) (Oral)   Resp 16   Ht 1.638 m (5' 4.5\")   Wt 61.2 kg (135 lb)   SpO2 99%   BMI 22.81 kg/m    Temp (24hrs), Av.5  F (36.4  C), Min:96.3  F (35.7  C), Max:98.6  F (37  C)    No data found.    Intake/Output Summary (Last 24 hours) at 2021 1419  Last data filed at 2021 0900  Gross per 24 hour   Intake 650 ml   Output 15 ml   Net 635 ml       PHYSICAL EXAM  Abdomen: Abdomen soft    Additional Comments:  ROS, FH, SH: See initial GI consult for details.    I have reviewed the patient's new clinical lab results:    Recent Labs   Lab Test 21   WBC 10.1 10.0   HGB 12.6 13.2   MCV 84 82    321     Recent Labs   Lab Test 21  0602 21  2230 21  1502 21  0538 21  191   POTASSIUM 3.8 3.6 3.4 3.4   < > 2.6*   CHLORIDE 111*  --   --  113*  --  105   CO2 27  --   --  26  --  29   BUN 3*  --   --  4*  --  7   ANIONGAP 4  --   --  3  --  4    < > = values in this interval not displayed.     Recent Labs   Lab Test 21   ALBUMIN 3.4  --    BILITOTAL 1.0  --    ALT 14  --    AST 10  --    PROTEIN  --  Negative     Colon and Rectal Surgery 2021  OPERATIVE FINDINGS: Anal stricture with opening approximately 4 mm in diameter.  Serial dilations with Hegar dilators up to 18 Malawian.  Flexible sigmoidoscopy with inflammation in the distal rectum, normal proximal rectum, normal sigmoid colon.    ASSESSMENT/PLAN  Anal stenosis in the setting of Ileocolonic crohn's disease  Dilation today  Mild improvement in pain  Regular diet and start humira as soon as outpatient  Discharge if stable in AM     Angie Brian MD  [facility]  Office       "

## 2021-05-05 NOTE — OR NURSING
Pt fully awake and coherent; has just spoken w/her  on the phone.  Pts rings x3 returned to Pt and she placed all three back onto her Lt hand finger #4.

## 2021-05-05 NOTE — ANESTHESIA CARE TRANSFER NOTE
Patient: Jacqueline Ruiz    Procedure(s):  EXAM UNDER ANESTHESIA, DILATION OF ANAL STRICTURE  INTRAOPERATIVE FLEXIBLE SIGMOIDOSCOPY  SIGMOID AND RECTAL BIOPSIES    Diagnosis: Anal stenosis [K62.4]  Diagnosis Additional Information: No value filed.    Anesthesia Type:   MAC     Note:    Oropharynx: oropharynx clear of all foreign objects  Level of Consciousness: awake  Oxygen Supplementation: face mask    Independent Airway: airway patency satisfactory and stable  Dentition: dentition unchanged  Vital Signs Stable: post-procedure vital signs reviewed and stable  Report to RN Given: handoff report given  Patient transferred to: PACU    Handoff Report: Identifed the Patient, Identified the Reponsible Provider, Reviewed the pertinent medical history, Discussed the surgical course, Reviewed Intra-OP anesthesia mangement and issues during anesthesia, Set expectations for post-procedure period and Allowed opportunity for questions and acknowledgement of understanding      Vitals: (Last set prior to Anesthesia Care Transfer)  CRNA VITALS  5/5/2021 0752 - 5/5/2021 0824      5/5/2021             Resp Rate (set):  10        Electronically Signed By: BRIEN Mims CRNA  May 5, 2021  8:24 AM

## 2021-05-05 NOTE — PROGRESS NOTES
[facility]    Hospitalist Progress Note    Date of Service: 05/05/2021    Brief Summary:  Ms. Jacqueline Ruiz is an extremely pleasant 55-year-old female with a past medical history of Crohn's disease, currently on Humira, who presented for evaluation of rectal pain and diarrhea.    Assessment & Plan     Crohn's disease flareup.   Proctocolitis.  Patient with history of Crohn's disease, multiple bowel resection, no come with worsening rectal pain and diarrhea, no blood or mucus, going for few months, get worse in the last 2 weeks, she did missed her dose of Humira because of dental infection. Now daily symptoms. Check ESR and CRP    CT abdomen and pelvis on admission shows diffuse bowel wall thickening throughout the colon, most prominent in the sigmoid and rectum, suggestive of proctocolitis. C diff and enteric bacterial and viral panel negative. I think this is secondary to her Crohn's disease and now worse as she missed the dose of her Humira.     Sigmoidoscopy attempted by [facility], though she did have a severe anal stenosis and a small gastroscope could not be passed.  Colorectal surgery was consulted for further evaluation -> S/P EUA 05/05.  Plan:  - Defer to GI in regards to steroids  - Pain control as needed  - Follow vitals/temp  - CBC in AM  - Diet per surgery  - PRN pain meds     DVT Prophylaxis: Pneumatic Compression Devices  Code Status: Full Code    Disposition: Expected discharge tomorrow pending GI and Yeaddiss-Rectal recs    Ramos Singer MD  Text Page  (7am - 6pm)    Interval History     Doing well post EUA  Some lower abdomen soreness, no fevers or chills  No CP/SOB  No other abdominal pain, nausea, vomiting, headache or dizziness.   No other significant event overnight.     -Data reviewed today: I reviewed all new labs and imaging results over the last 24 hours. I personally reviewed no images or EKG's today.    Physical Exam   Temp: 98.3  F (36.8  C) Temp src: Oral BP: 94/48 Pulse:  70   Resp: 16 SpO2: 94 % O2 Device: None (Room air) Oxygen Delivery: 2.5 LPM  Vitals:    05/01/21 1630 05/02/21 1418   Weight: 61.2 kg (135 lb) 61.2 kg (135 lb)     Vital Signs with Ranges  Temp:  [96.3  F (35.7  C)-98.3  F (36.8  C)] 98.3  F (36.8  C)  Pulse:  [49-82] 70  Resp:  [16-18] 16  BP: ()/(48-73) 94/48  SpO2:  [94 %-100 %] 94 %  I/O last 3 completed shifts:  In: 650 [I.V.:650]  Out: 15 [Blood:15]    Constitutional: no apparent distress  Respiratory: No increased work of breathing, good air exchange, clear to auscultation bilaterally, no crackles or wheezing  Cardiovascular: Normal apical impulse, regular rate and rhythm, normal S1 and S2, no S3 or S4, and no murmur noted  GI: No scars, normal bowel sounds, soft, non-distended, non-tender,  Musculoskeletal: no lower extremity pitting edema present  Neurologic: no focal deficit.     Medications     0.9% sodium chloride + KCl 20 mEq/L 75 mL/hr at 05/05/21 1012     - MEDICATION INSTRUCTIONS -         sodium chloride (PF)  3 mL Intracatheter Q8H       Data   Recent Labs   Lab 05/03/21  0602 05/02/21  2230 05/02/21  1502 05/02/21  0538 05/01/21  1911 05/01/21  1911   WBC  --   --   --   --   --  10.1   HGB  --   --   --   --   --  12.6   MCV  --   --   --   --   --  84   PLT  --   --   --   --   --  410     --   --  142  --  138   POTASSIUM 3.8 3.6 3.4 3.4   < > 2.6*   CHLORIDE 111*  --   --  113*  --  105   CO2 27  --   --  26  --  29   BUN 3*  --   --  4*  --  7   CR 0.63  --   --  0.59  --  0.65   ANIONGAP 4  --   --  3  --  4   TRACIE 8.2*  --   --  8.1*  --  8.7   GLC 69*  --   --  91  --  91   ALBUMIN  --   --   --   --   --  3.4   PROTTOTAL  --   --   --   --   --  7.1   BILITOTAL  --   --   --   --   --  1.0   ALKPHOS  --   --   --   --   --  54   ALT  --   --   --   --   --  14   AST  --   --   --   --   --  10    < > = values in this interval not displayed.       No results found for this or any previous visit (from the past 24 hour(s)).

## 2021-05-05 NOTE — BRIEF OP NOTE
[facility]    Brief Operative Note    Pre-operative diagnosis: Anal stenosis [K62.4], Crohn's disease   Post-operative diagnosis Same as pre-operative diagnosis    Procedure: Procedure(s):  EXAM UNDER ANESTHESIA, DILATION OF ANAL STRICTURE  INTRAOPERATIVE FLEXIBLE SIGMOIDOSCOPY  SIGMOID AND RECTAL BIOPSIES  Surgeon: Surgeon(s) and Role:     * Ashlyn Sanchez MD - Primary  Anesthesia: Monitor Anesthesia Care   Estimated blood loss: Minimal  Drains: None  Specimens:   ID Type Source Tests Collected by Time Destination   A : SIGMOID COLON BIOPSY Biopsy Large Intestine, Sigmoid SURGICAL PATHOLOGY EXAM Ashlyn Sanchez MD 5/5/2021  8:08 AM    B : RECTAL BIOPSIES Biopsy Large Intestine, Rectum SURGICAL PATHOLOGY EXAM Ashlyn Sanchez MD 5/5/2021  8:11 AM      Findings:   anal stricture, dilated to 18 Liberian with some expected bleeding, some inflammation and nodularity of the distal rectum, proximal rectum and sigmoid colon normal in appearance .  Complications: None.  Implants: * No implants in log *

## 2021-05-05 NOTE — PLAN OF CARE
A&Ox4.  VSS.  Independent in room.  Tolerated fulls, NPO after midnight for flex sig with anesthesia in the morning.  IVF.  Denied pain, some abdominal discomfort.  1 loose stool this shift.

## 2021-05-05 NOTE — ANESTHESIA PREPROCEDURE EVALUATION
Anesthesia Pre-Procedure Evaluation    Patient: Jacqueline Ruiz   MRN: [id number] : 1965        Preoperative Diagnosis: Anal stenosis [K62.4]   Procedure : Procedure(s):  EXAM UNDER ANESTHESIA, DILATION OF ANAL STRICTURE  INTRAOPERATIVE FLEXIBLE SIGMOIDOSCOPY     Past Medical History:   Diagnosis Date     PVC (premature ventricular contraction)       Past Surgical History:   Procedure Laterality Date     APPENDECTOMY       CHOLECYSTECTOMY       COLONOSCOPY       ORTHOPEDIC SURGERY      left elbow fx      SIGMOIDOSCOPY FLEXIBLE N/A 2021    Procedure: SIGMOIDOSCOPY, FLEXIBLE;  Surgeon: Valentina Belcher MD;  Location:  GI      Allergies   Allergen Reactions     Iodine Anaphylaxis     Other [Seasonal Allergies] Anaphylaxis     IVP dye     Azathioprine Nausea     Ciprofloxacin Unknown     Infliximab      Other reaction(s): Flushing     Morphine Nausea and Vomiting     Sulfa Drugs      Vicodin [Hydrocodone-Acetaminophen]      Migraine        Social History     Tobacco Use     Smoking status: Never Smoker     Smokeless tobacco: Never Used   Substance Use Topics     Alcohol use: No      Wt Readings from Last 1 Encounters:   21 61.2 kg (135 lb)        Anesthesia Evaluation   Pt has had prior anesthetic.     No history of anesthetic complications       ROS/MED HX  ENT/Pulmonary:  - neg pulmonary ROS  (-) tobacco use and sleep apnea   Neurologic:  - neg neurologic ROS     Cardiovascular:  - neg cardiovascular ROS     METS/Exercise Tolerance:     Hematologic:       Musculoskeletal:       GI/Hepatic: Comment: Crohn's ds   (-) GERD   Renal/Genitourinary:     (+) Nephrolithiasis ,     Endo:  - neg endo ROS     Psychiatric/Substance Use:       Infectious Disease:       Malignancy:       Other:            Physical Exam    Airway        Mallampati: II   TM distance: > 3 FB   Neck ROM: full   Mouth opening: > 3 cm    Respiratory Devices and Support         Dental  no notable dental history          Cardiovascular   cardiovascular exam normal          Pulmonary   pulmonary exam normal                OUTSIDE LABS:  CBC:   Lab Results   Component Value Date    WBC 10.1 05/01/2021    WBC 10.0 02/07/2018    HGB 12.6 05/01/2021    HGB 13.2 02/07/2018    HCT 39.2 05/01/2021    HCT 39.7 02/07/2018     05/01/2021     02/07/2018     BMP:   Lab Results   Component Value Date     05/03/2021     05/02/2021    POTASSIUM 3.8 05/03/2021    POTASSIUM 3.6 05/02/2021    CHLORIDE 111 (H) 05/03/2021    CHLORIDE 113 (H) 05/02/2021    CO2 27 05/03/2021    CO2 26 05/02/2021    BUN 3 (L) 05/03/2021    BUN 4 (L) 05/02/2021    CR 0.63 05/03/2021    CR 0.59 05/02/2021    GLC 69 (L) 05/03/2021    GLC 91 05/02/2021     COAGS: No results found for: PTT, INR, FIBR  POC:   Lab Results   Component Value Date    BGM 93 05/05/2021     HEPATIC:   Lab Results   Component Value Date    ALBUMIN 3.4 05/01/2021    PROTTOTAL 7.1 05/01/2021    ALT 14 05/01/2021    AST 10 05/01/2021    ALKPHOS 54 05/01/2021    BILITOTAL 1.0 05/01/2021     OTHER:   Lab Results   Component Value Date    LACT 0.9 02/07/2018    TRACIE 8.2 (L) 05/03/2021    CRP <2.9 05/02/2021    SED 16 05/02/2021       Anesthesia Plan    ASA Status:  2   NPO Status:  NPO Appropriate    Anesthesia Type: MAC.     - Reason for MAC: straight local not clinically adequate              Consents    Anesthesia Plan(s) and associated risks, benefits, and realistic alternatives discussed. Questions answered and patient/representative(s) expressed understanding.     - Discussed with:  Patient         Postoperative Care    Pain management: Multi-modal analgesia.   PONV prophylaxis: Ondansetron (or other 5HT-3)     Comments:                David Cook MD

## 2021-05-05 NOTE — OP NOTE
OPERATIVE REPORT      PREOPERATIVE DIAGNOSIS: Crohn's disease with anal stricture    POSTOPERATIVE DIAGNOSIS: Same    OPERATION PERFORMED:   1. Exam under anesthesia  2. Dilation of anal stricture with serial Hegar dilators, up to 18 Romanian  3. Flexible sigmoidoscopy with biopsies    SURGEON: Ashlyn Sanchez MD      ANESTHESIA: General.     INDICATIONS: Jacqueline Ruiz is a 55 year old woman with a known history of Crohn's disease maintained on Humira, status post multiple previous ileocolic resections and known anal stricture, who presented with 6 to 7 months of ongoing pelvic pain and difficulty with evacuation.  She underwent attempt at flexible sigmoidoscopy, but was noted to have a tight anal stricture and they were unable to pass the colonoscope.  She was therefore recommended to undergo an exam under anesthesia, anal dilation and flexible sigmoidoscopy with biopsies.  The risks and benefits of these procedures were discussed, and the patient agreed to proceed.     OPERATIVE FINDINGS: Anal stricture with opening approximately 4 mm in diameter.  Serial dilations with Hegar dilators up to 18 Romanian.  Flexible sigmoidoscopy with inflammation in the distal rectum, normal proximal rectum, normal sigmoid colon.    PROCEDURE IN DETAIL: After informed consent was obtained the patient was brought to the operating room. SCD's were placed and bilateral lower extremeties, the patient was flipped into a well-padded prone jackknife position, and MAC anesthesia was induced without difficulty.  The buttocks were then taped apart and the perianal region was sterilely prepped and draped in usual fashion. An anal field block consisting of 30 mL of 0.5% Marcaine was instilled into the 4 quadrants of the anal canal.     External exam revealed inflammatory skin tags circumferentially and a tight anal stricture with an approximately 4 mm opening.  I was unable to pass even my smallest finger into the anus.  I then proceeded with  serial dilation with enlarging Hegar dilators.  I was able to pass dilators easily up to 18 Guyanese with serial dilation.  There was a small amount of bleeding as expected. I then proceeded with a flexible sigmoidoscopy.  The pediatric colonoscope was easily passed through the dilated anus and into the rectum.  There was some inflammation distally in the rectum which was biopsied.  The scope was advanced to approximately 50 cm into the descending colon.  The proximal rectum, sigmoid colon and proximal descending colon all appeared normal.  Random biopsies were taken in the sigmoid colon for pathologic analysis.      Given the small amount of bleeding with dilation, the anus was packed with Gelfoam packing to assist with hemostasis. The patient tolerated the procedure well without complications. At the end of the procedure, all needle, sponge and instrument counts were correct. The patient was returned to the supine position and brought to the recovery room in stable condition.    EBL: 15 ml  SPECIMEN: sigmoid colon biopsies, distal rectal biopsies   COMPLICATIONS: none    Ashlyn Sanchez MD

## 2021-05-05 NOTE — OR NURSING
Pt now transferred out of PACU.  MDA notified of Pts c/o tongue discomfort earlier on  - tongue assessed by MDA no deficits noted impression Pt may have bitten herself - close monitoring ordered by MDA.  On transfer out of PACU Pt reported resolving tongue discomfort.   Pt talking and has tolerated ice-chips w/o difficulty.

## 2021-05-06 VITALS
HEART RATE: 84 BPM | DIASTOLIC BLOOD PRESSURE: 59 MMHG | WEIGHT: 135.85 LBS | HEIGHT: 64 IN | RESPIRATION RATE: 18 BRPM | BODY MASS INDEX: 23.19 KG/M2 | OXYGEN SATURATION: 100 % | SYSTOLIC BLOOD PRESSURE: 100 MMHG | TEMPERATURE: 98.6 F

## 2021-05-06 LAB
COPATH REPORT: NORMAL
ERYTHROCYTE [DISTWIDTH] IN BLOOD BY AUTOMATED COUNT: 13.1 % (ref 10–15)
HCT VFR BLD AUTO: 37.5 % (ref 35–47)
HGB BLD-MCNC: 11.8 G/DL (ref 11.7–15.7)
MCH RBC QN AUTO: 27.2 PG (ref 26.5–33)
MCHC RBC AUTO-ENTMCNC: 31.5 G/DL (ref 31.5–36.5)
MCV RBC AUTO: 86 FL (ref 78–100)
PLATELET # BLD AUTO: 349 10E9/L (ref 150–450)
POTASSIUM SERPL-SCNC: 3.9 MMOL/L (ref 3.4–5.3)
RBC # BLD AUTO: 4.34 10E12/L (ref 3.8–5.2)
WBC # BLD AUTO: 7.4 10E9/L (ref 4–11)

## 2021-05-06 PROCEDURE — 36415 COLL VENOUS BLD VENIPUNCTURE: CPT | Performed by: STUDENT IN AN ORGANIZED HEALTH CARE EDUCATION/TRAINING PROGRAM

## 2021-05-06 PROCEDURE — 84132 ASSAY OF SERUM POTASSIUM: CPT | Performed by: STUDENT IN AN ORGANIZED HEALTH CARE EDUCATION/TRAINING PROGRAM

## 2021-05-06 PROCEDURE — 99239 HOSP IP/OBS DSCHRG MGMT >30: CPT | Performed by: STUDENT IN AN ORGANIZED HEALTH CARE EDUCATION/TRAINING PROGRAM

## 2021-05-06 PROCEDURE — 85027 COMPLETE CBC AUTOMATED: CPT | Performed by: STUDENT IN AN ORGANIZED HEALTH CARE EDUCATION/TRAINING PROGRAM

## 2021-05-06 ASSESSMENT — ACTIVITIES OF DAILY LIVING (ADL)
ADLS_ACUITY_SCORE: 14

## 2021-05-06 NOTE — DISCHARGE SUMMARY
[facility]  Hospitalist Discharge Summary      Date of Admission:  5/1/2021  Date of Discharge:  5/6/2021  Discharging Provider: Ramos Singer MD      Discharge Diagnoses     Anal Stenosis    Follow-ups Needed After Discharge   Follow-up Appointments     Follow-up and recommended labs and tests       Follow up with primary care provider, Vance Good, within 7 days for   hospital follow- up.  The following labs/tests are recommended: None.           Unresulted Labs Ordered in the Past 30 Days of this Admission     No orders found from 4/1/2021 to 5/2/2021.        Discharge Disposition     Discharged to home  Condition at discharge: Stable    Hospital Course   Crohn's disease flareup.   Proctocolitis.  Patient with history of Crohn's disease, multiple bowel resection, no come with worsening rectal pain and diarrhea, no blood or mucus, going for few months, get worse in the last 2 weeks, she did missed her dose of Humira because of dental infection. Now daily symptoms. Check ESR and CRP    CT abdomen and pelvis on admission shows diffuse bowel wall thickening throughout the colon, most prominent in the sigmoid and rectum, suggestive of proctocolitis. C diff and enteric bacterial and viral panel negative. I think this is secondary to her Crohn's disease and now worse as she missed the dose of her Humira.     Sigmoidoscopy attempted by [facility], though she did have a severe anal stenosis and a small gastroscope could not be passed.  Colorectal surgery was consulted for further evaluation -> S/P EUA 05/05 with improvement in symptoms.     Plan:  - Surgery will plan for follow-up in 1 month in clinic to assess anal stenosis.  - Follow up will be scheduled with [facility]  - She will take her Humira at home    Consultations This Hospital Stay   GASTROENTEROLOGY IP CONSULT  COLORECTAL SURGERY IP CONSULT    Code Status   Full Code    Time Spent on this Encounter   I, Ramos Singer MD, personally saw the patient  today and spent greater than 30 minutes discharging this patient.       Ramos Singer MD  [place] [address] [facility]  [address]  [place] [address]  Phone: [phone]  ______________________________________________________________________    Physical Exam   Vital Signs: Temp: 98.1  F (36.7  C) Temp src: Oral BP: 103/58 Pulse: 74   Resp: 18 SpO2: 96 % O2 Device: None (Room air)    Weight: 135 lbs 13.6 oz       Primary Care Physician   Vance Good    Discharge Orders      Reason for your hospital stay    You had rectal discomfort and was seen by our colorectal and gastroenterology teams.     Follow-up and recommended labs and tests     Follow up with primary care provider, Vance Good, within 7 days for hospital follow- up.  The following labs/tests are recommended: None.     Activity    Your activity upon discharge: activity as tolerated     Diet    Follow this diet upon discharge: Orders Placed This Encounter      Snacks/Supplements Adult: Boost Breeze; With Meals      Regular Diet Adult       Significant Results and Procedures   Most Recent 3 CBC's:  Recent Labs   Lab Test 05/06/21 0652 05/01/21 1911 02/07/18 2010   WBC 7.4 10.1 10.0   HGB 11.8 12.6 13.2   MCV 86 84 82    410 321     Most Recent 3 BMP's:  Recent Labs   Lab Test 05/06/21  0652 05/03/21  0602 05/02/21 2230 05/02/21 0538 05/02/21 0538 05/01/21 1911 05/01/21 1911   NA  --  142  --   --  142  --  138   POTASSIUM 3.9 3.8 3.6   < > 3.4   < > 2.6*   CHLORIDE  --  111*  --   --  113*  --  105   CO2  --  27  --   --  26  --  29   BUN  --  3*  --   --  4*  --  7   CR  --  0.63  --   --  0.59  --  0.65   ANIONGAP  --  4  --   --  3  --  4   TRACIE  --  8.2*  --   --  8.1*  --  8.7   GLC  --  69*  --   --  91  --  91    < > = values in this interval not displayed.     Most Recent 2 LFT's:  Recent Labs   Lab Test 05/01/21 1911   AST 10   ALT 14   ALKPHOS 54   BILITOTAL 1.0     Most Recent 3 INR's:No lab results  found.  Most Recent ESR & CRP:  Recent Labs   Lab Test 05/02/21  1502 05/02/21  0538   SED 16  --    CRP  --  <2.9   ,   Results for orders placed or performed during the hospital encounter of 05/01/21   CT Abdomen Pelvis w/o Contrast    Narrative    CT ABDOMEN AND PELVIS WITHOUT CONTRAST 5/1/2021 7:20 PM    CLINICAL HISTORY: Diarrhea. Rectal pain.  TECHNIQUE: CT scan of the abdomen and pelvis was performed without IV  contrast. Multiplanar reformats were obtained. Dose reduction  techniques were used.  CONTRAST: None.  COMPARISON: CT of the abdomen and pelvis performed 2/7/2018.    FINDINGS:   LOWER CHEST: The visualized lung bases are clear.    HEPATOBILIARY: Prior cholecystectomy. 1.7 cm cyst in the posterior  segment of the right hepatic lobe, not significantly changed. No other  hepatic masses are seen.    PANCREAS: Normal.    SPLEEN: Normal.    ADRENAL GLANDS: Normal.    KIDNEYS/BLADDER: Nonobstructing stone in the lower pole of the right  kidney measures 0.3 cm, and is new since the previous exam. No other  urinary calculi are identified. No hydronephrosis.    BOWEL: Mild colonic bowel wall thickening throughout the colon is most  prominent in the sigmoid colon and rectum, findings suggest a  nonspecific proctocolitis. No bowel obstruction. Postoperative changes  are noted involving the ileocecal region. The appendix is not seen,  and may be surgically absent. No perianal fluid collections are  identified.    PELVIC ORGANS: Unremarkable.    LYMPH NODES: No enlarged lymph nodes are identified in the abdomen or  pelvis.    VASCULATURE: Mild atherosclerotic aortoiliac calcification.    ADDITIONAL FINDINGS: None.    MUSCULOSKELETAL: Lumbar curve, convex right.      Impression    IMPRESSION:   1.  Diffuse bowel wall thickening throughout the colon, most prominent  in the sigmoid colon and rectum. Findings suggest a nonspecific  proctocolitis, most likely infectious or inflammatory in etiology.  2.  Nonobstructing  0.3 cm stone in the lower pole of the right kidney.    JIMMY SANDOVAL MD       Discharge Medications   Current Discharge Medication List      CONTINUE these medications which have NOT CHANGED    Details   adalimumab (HUMIRA) 40 MG/0.8ML prefilled syringe kit Inject 40 mg Subcutaneous every 14 days      Cholecalciferol (D3 PO) Take 8,000 Units by mouth daily      Lactobacillus Rhamnosus, GG, (RA PROBIOTIC DIGESTIVE CARE) CAPS Take 1 capsule by mouth daily           Allergies   Allergies   Allergen Reactions     Iodine Anaphylaxis     Other [Seasonal Allergies] Anaphylaxis     IVP dye     Azathioprine Nausea     Ciprofloxacin Unknown     Infliximab      Other reaction(s): Flushing     Morphine Nausea and Vomiting     Sulfa Drugs      Vicodin [Hydrocodone-Acetaminophen]      Migraine

## 2021-05-06 NOTE — PROGRESS NOTES
"GASTROENTEROLOGY PROGRESS NOTE    CC: Anal stenosis in face of ileo colonic crohn's disease    SUBJECTIVE:  Feels better > BM today without pain    OBJECTIVE:  General Appearance:  Wee nourished  /58 (BP Location: Left arm)   Pulse 74   Temp 98.1  F (36.7  C) (Oral)   Resp 18   Ht 1.638 m (5' 4.49\")   Wt 61.6 kg (135 lb 13.6 oz)   SpO2 96%   BMI 22.97 kg/m    Temp (24hrs), Av.7  F (36.5  C), Min:96.3  F (35.7  C), Max:98.6  F (37  C)    Patient Vitals for the past 72 hrs:   Weight   21 61.6 kg (135 lb 13.6 oz)       Intake/Output Summary (Last 24 hours) at 2021 0830  Last data filed at 2021 0900  Gross per 24 hour   Intake 50 ml   Output --   Net 50 ml       PHYSICAL EXAM  Abdomen: Abdomen soft, non-tender. BS normal. No masses, organomegaly    Additional Comments:  ROS, FH, SH: See initial GI consult for details.    I have reviewed the patient's new clinical lab results:    Recent Labs   Lab Test 21   WBC 7.4 10.1 10.0   HGB 11.8 12.6 13.2   MCV 86 84 82    410 321     Recent Labs   Lab Test 2152 21  0602 21   POTASSIUM 3.9 3.8 3.6   < > 3.4   < > 2.6*   CHLORIDE  --  111*  --   --  113*  --  105   CO2  --  27  --   --  26  --  29   BUN  --  3*  --   --  4*  --  7   ANIONGAP  --  4  --   --  3  --  4    < > = values in this interval not displayed.     Recent Labs   Lab Test 21   ALBUMIN 3.4  --    BILITOTAL 1.0  --    ALT 14  --    AST 10  --    PROTEIN  --  Negative     Colon and Rectal Surgery 2021  OPERATIVE FINDINGS: Anal stricture with opening approximately 4 mm in diameter.  Serial dilations with Hegar dilators up to 18 Sudanese.  Flexible sigmoidoscopy with inflammation in the distal rectum, normal proximal rectum, normal sigmoid colon.     ASSESSMENT/PLAN  Anal stenosis in the setting of Ileocolonic crohn's " disease  S/p Dilation  Feels better today. Okay to discharge  Follow up will be scheduled with [facility]  She will take her Humira at home      Angie Brian MD  [facility]  Office

## 2021-05-06 NOTE — PLAN OF CARE
Pt A&Ox4; VSS except for soft BP; asymptomatic. K+ 3.9. tolerating PO; no c/o nausea or vomiting. Soft BM x1. Showered. Discharge orders received; instructions reviewed with pt. All questions answered. A copy of AVS given to pt. All belongings returned to pt. Pt discharged from floor via w/c accompanied by NA; family providing transportation to home.

## 2021-05-06 NOTE — PROVIDER NOTIFICATION
Pt requested if her IV can be taken out for  showering as GI/Colorectal gave okay for pt to discharge home. Dr. Singer notified via web-page; order received to discontinue IV fluid and let pt shower.

## 2021-05-06 NOTE — PROGRESS NOTES
COLON & RECTAL SURGERY  PROGRESS NOTE    May 6, 2021  Post-op Day # 1    SUBJECTIVE:    Feels improved.  Past gas and small bowel movement.  Some perianal bleeding.    OBJECTIVE:  Temp:  [96.3  F (35.7  C)-98.6  F (37  C)] 98.1  F (36.7  C)  Pulse:  [53-77] 74  Resp:  [16-18] 18  BP: ()/(48-66) 103/58  SpO2:  [94 %-99 %] 96 %  No intake or output data in the 24 hours ending 05/06/21 1002    GENERAL:  Awake, alert, no acute distress  EXTREMITIES: Warm and well perfused, no edema   ABDOMEN:  Soft, non tender, non-distended.     LABS:  Lab Results   Component Value Date    WBC 7.4 05/06/2021     Lab Results   Component Value Date    HGB 11.8 05/06/2021     Lab Results   Component Value Date    HCT 37.5 05/06/2021     Lab Results   Component Value Date     05/06/2021     Last Basic Metabolic Panel:  Lab Results   Component Value Date     05/03/2021      Lab Results   Component Value Date    POTASSIUM 3.9 05/06/2021     Lab Results   Component Value Date    CHLORIDE 111 05/03/2021     Lab Results   Component Value Date    TRACIE 8.2 05/03/2021     Lab Results   Component Value Date    CO2 27 05/03/2021     Lab Results   Component Value Date    BUN 3 05/03/2021     Lab Results   Component Value Date    CR 0.63 05/03/2021     Lab Results   Component Value Date    GLC 69 05/03/2021       ASSESSMENT/PLAN: Jacqueline Ruiz is a 55-year-old lady with Crohn's disease since age 19, currently on Humira, and multiple prior ileocolic resection, who presents with 6-7 months of rectal pain/urgency/tenesmus and diarrhea. CT scan shows proctocolitis. Flex sig on 5/2/21 by GI was aborted due to the findings of severe anal stenosis that was not able to accommodate a scope.  Yesterday she underwent an exam under anesthesia, dilation of anal stenosis and flexible sigmoidoscopy with biopsies.  There was no evidence of active Crohn's disease in the sigmoid colon or rectum.      Diet as tolerated.    Okay to discharge from a  colorectal surgery perspective.  We will plan for follow-up in 1 month in clinic to assess anal stenosis.    Follow-up surgical biopsies.    Management of Humira per GI.    For questions/paging, please contact the CRS office at [phone].    Ashlyn Sanchez MD  Colorectal Surgery    [facility]  [address]  [place] [address]  T: [phone]  F: [phone]

## 2021-05-06 NOTE — DISCHARGE INSTRUCTIONS
We will plan for follow-up in 1 month in clinic to assess anal stenosis.  Follow-up surgical biopsies.    Ashlyn Sanchez MD  Colorectal Surgery   [facility]  [address]  [place] [address]  T: [phone]  F: [phone]    Follow up will be scheduled with [facility]

## 2021-05-06 NOTE — PLAN OF CARE
Pt is A&Ox4. VSS on RA. Denies pain. Tolerating regular diet. Up independently in room. Continent of B/B. One yellow loose stool yesterday after flex sig, no BM overnight. BS active. Walked halls x1 last night. PIV infusing NS + 20meq K+. Discharge home likely today.

## 2021-05-25 ENCOUNTER — HOSPITAL ENCOUNTER (OUTPATIENT)
Dept: MAMMOGRAPHY | Facility: CLINIC | Age: 56
Discharge: HOME | End: 2021-05-25
Attending: PHYSICIAN ASSISTANT | Admitting: PHYSICIAN ASSISTANT
Payer: COMMERCIAL

## 2021-05-25 DIAGNOSIS — Z12.31 VISIT FOR SCREENING MAMMOGRAM: ICD-10-CM

## 2021-05-25 PROCEDURE — 77063 BREAST TOMOSYNTHESIS BI: CPT

## 2021-07-10 ENCOUNTER — HEALTH MAINTENANCE LETTER (OUTPATIENT)
Age: 56
End: 2021-07-10

## 2021-09-04 ENCOUNTER — HEALTH MAINTENANCE LETTER (OUTPATIENT)
Age: 56
End: 2021-09-04

## 2022-07-11 ENCOUNTER — HOSPITAL ENCOUNTER (OUTPATIENT)
Dept: MAMMOGRAPHY | Facility: CLINIC | Age: 57
Discharge: HOME | End: 2022-07-11
Attending: PHYSICIAN ASSISTANT | Admitting: PHYSICIAN ASSISTANT
Payer: COMMERCIAL

## 2022-07-11 DIAGNOSIS — Z12.31 VISIT FOR SCREENING MAMMOGRAM: ICD-10-CM

## 2022-07-11 PROCEDURE — 77067 SCR MAMMO BI INCL CAD: CPT

## 2022-08-06 ENCOUNTER — HEALTH MAINTENANCE LETTER (OUTPATIENT)
Age: 57
End: 2022-08-06

## 2022-10-16 ENCOUNTER — HEALTH MAINTENANCE LETTER (OUTPATIENT)
Age: 57
End: 2022-10-16

## 2023-08-26 ENCOUNTER — HEALTH MAINTENANCE LETTER (OUTPATIENT)
Age: 58
End: 2023-08-26

## 2023-08-31 ENCOUNTER — HOSPITAL ENCOUNTER (OUTPATIENT)
Dept: MAMMOGRAPHY | Facility: CLINIC | Age: 58
Discharge: HOME | End: 2023-08-31
Attending: INTERNAL MEDICINE | Admitting: INTERNAL MEDICINE
Payer: COMMERCIAL

## 2023-08-31 DIAGNOSIS — Z12.31 VISIT FOR SCREENING MAMMOGRAM: ICD-10-CM

## 2023-08-31 PROCEDURE — 77067 SCR MAMMO BI INCL CAD: CPT

## 2023-11-04 ENCOUNTER — HEALTH MAINTENANCE LETTER (OUTPATIENT)
Age: 58
End: 2023-11-04

## 2024-12-22 ENCOUNTER — HEALTH MAINTENANCE LETTER (OUTPATIENT)
Age: 59
End: 2024-12-22

## (undated) DEVICE — NDL 19GA 1.5"

## (undated) DEVICE — GLOVE PROTEXIS W/NEU-THERA 7.0  2D73TE70

## (undated) DEVICE — PACK MINOR SBA15MIFSE

## (undated) DEVICE — JELLY LUBRICATING SURGILUBE 4OZ TUBE

## (undated) DEVICE — TAPE CLOTH ADHESIVE 3" LATEX FREE

## (undated) DEVICE — GLOVE PROTEXIS W/NEU-THERA 6.0  2D73TE60

## (undated) DEVICE — DRSG ABDOMINAL 07 1/2X8" 7197D

## (undated) DEVICE — DRAPE MINOR PROCEDURE LAP 29496

## (undated) DEVICE — SPONGE SURGIFOAM 100 1974

## (undated) DEVICE — LUBRICATING JELLY 4.25OZ

## (undated) DEVICE — LINEN TOWEL PACK X5 5464

## (undated) DEVICE — SYR BULB IRRIG 50ML LATEX FREE 0035280

## (undated) DEVICE — PANTIES MESH LG/XLG 2PK 706M2

## (undated) DEVICE — SPONGE BALL KERLIX ROUND XL W/O STRING LATEX 4935

## (undated) DEVICE — ESU PENCIL W/SMOKE EVAC CVPLP2000

## (undated) DEVICE — SUCTION CANISTER MEDIVAC LINER 3000ML W/LID 65651-530

## (undated) DEVICE — DECANTER BAG 2002S

## (undated) DEVICE — GLOVE PROTEXIS BLUE W/NEU-THERA 6.5  2D73EB65

## (undated) DEVICE — ESU GROUND PAD UNIVERSAL W/O CORD

## (undated) DEVICE — SUCTION TIP YANKAUER W/O VENT K86

## (undated) DEVICE — PAD CHUX UNDERPAD 30X30"

## (undated) DEVICE — SOL WATER IRRIG 1000ML BOTTLE 2F7114

## (undated) RX ORDER — DEXAMETHASONE SODIUM PHOSPHATE 4 MG/ML
INJECTION, SOLUTION INTRA-ARTICULAR; INTRALESIONAL; INTRAMUSCULAR; INTRAVENOUS; SOFT TISSUE
Status: DISPENSED
Start: 2021-05-05

## (undated) RX ORDER — FENTANYL CITRATE 50 UG/ML
INJECTION, SOLUTION INTRAMUSCULAR; INTRAVENOUS
Status: DISPENSED
Start: 2021-05-05

## (undated) RX ORDER — FENTANYL CITRATE 50 UG/ML
INJECTION, SOLUTION INTRAMUSCULAR; INTRAVENOUS
Status: DISPENSED
Start: 2021-05-02

## (undated) RX ORDER — ONDANSETRON 2 MG/ML
INJECTION INTRAMUSCULAR; INTRAVENOUS
Status: DISPENSED
Start: 2021-05-05

## (undated) RX ORDER — PROPOFOL 10 MG/ML
INJECTION, EMULSION INTRAVENOUS
Status: DISPENSED
Start: 2021-05-05